# Patient Record
Sex: FEMALE | Race: WHITE | NOT HISPANIC OR LATINO | Employment: OTHER | URBAN - METROPOLITAN AREA
[De-identification: names, ages, dates, MRNs, and addresses within clinical notes are randomized per-mention and may not be internally consistent; named-entity substitution may affect disease eponyms.]

---

## 2022-05-26 ENCOUNTER — APPOINTMENT (EMERGENCY)
Dept: RADIOLOGY | Facility: HOSPITAL | Age: 72
DRG: 871 | End: 2022-05-26
Payer: MEDICARE

## 2022-05-26 ENCOUNTER — OFFICE VISIT (OUTPATIENT)
Dept: URGENT CARE | Facility: MEDICAL CENTER | Age: 72
End: 2022-05-26
Payer: MEDICARE

## 2022-05-26 ENCOUNTER — HOSPITAL ENCOUNTER (INPATIENT)
Facility: HOSPITAL | Age: 72
LOS: 3 days | Discharge: HOME | DRG: 871 | End: 2022-05-29
Attending: EMERGENCY MEDICINE | Admitting: INTERNAL MEDICINE
Payer: MEDICARE

## 2022-05-26 VITALS
HEART RATE: 80 BPM | DIASTOLIC BLOOD PRESSURE: 53 MMHG | SYSTOLIC BLOOD PRESSURE: 112 MMHG | RESPIRATION RATE: 18 BRPM | TEMPERATURE: 98.6 F | OXYGEN SATURATION: 93 %

## 2022-05-26 DIAGNOSIS — R50.9 FEVER, UNSPECIFIED FEVER CAUSE: ICD-10-CM

## 2022-05-26 DIAGNOSIS — J18.9 RLL PNEUMONIA: ICD-10-CM

## 2022-05-26 DIAGNOSIS — R06.02 SOB (SHORTNESS OF BREATH): Primary | ICD-10-CM

## 2022-05-26 DIAGNOSIS — I50.9 CHF (CONGESTIVE HEART FAILURE) (HCC): Primary | ICD-10-CM

## 2022-05-26 DIAGNOSIS — J18.9 PNEUMONIA: ICD-10-CM

## 2022-05-26 LAB
2HR DELTA HS TROPONIN: -6 NG/L
ALBUMIN SERPL BCP-MCNC: 3.6 G/DL (ref 3.5–5)
ALP SERPL-CCNC: 72 U/L (ref 46–116)
ALT SERPL W P-5'-P-CCNC: 28 U/L (ref 12–78)
ANION GAP SERPL CALCULATED.3IONS-SCNC: 8 MMOL/L (ref 4–13)
AST SERPL W P-5'-P-CCNC: 30 U/L (ref 5–45)
BASOPHILS # BLD MANUAL: 0 THOUSAND/UL (ref 0–0.1)
BASOPHILS NFR MAR MANUAL: 0 % (ref 0–1)
BILIRUB SERPL-MCNC: 2.2 MG/DL (ref 0.2–1)
BUN SERPL-MCNC: 18 MG/DL (ref 5–25)
CALCIUM SERPL-MCNC: 9.5 MG/DL (ref 8.3–10.1)
CARDIAC TROPONIN I PNL SERPL HS: 84 NG/L
CARDIAC TROPONIN I PNL SERPL HS: 90 NG/L
CHLORIDE SERPL-SCNC: 100 MMOL/L (ref 100–108)
CO2 SERPL-SCNC: 27 MMOL/L (ref 21–32)
CREAT SERPL-MCNC: 1.2 MG/DL (ref 0.6–1.3)
EOSINOPHIL # BLD MANUAL: 0 THOUSAND/UL (ref 0–0.4)
EOSINOPHIL NFR BLD MANUAL: 0 % (ref 0–6)
ERYTHROCYTE [DISTWIDTH] IN BLOOD BY AUTOMATED COUNT: 13.5 % (ref 11.6–15.1)
FLUAV RNA RESP QL NAA+PROBE: NEGATIVE
FLUBV RNA RESP QL NAA+PROBE: NEGATIVE
GFR SERPL CREATININE-BSD FRML MDRD: 44 ML/MIN/1.73SQ M
GLUCOSE SERPL-MCNC: 95 MG/DL (ref 65–140)
HCT VFR BLD AUTO: 39.8 % (ref 34.8–46.1)
HGB BLD-MCNC: 12.9 G/DL (ref 11.5–15.4)
LYMPHOCYTES # BLD AUTO: 12 % (ref 14–44)
LYMPHOCYTES # BLD AUTO: 2.11 THOUSAND/UL (ref 0.6–4.47)
MCH RBC QN AUTO: 30.9 PG (ref 26.8–34.3)
MCHC RBC AUTO-ENTMCNC: 32.4 G/DL (ref 31.4–37.4)
MCV RBC AUTO: 95 FL (ref 82–98)
MONOCYTES # BLD AUTO: 0.53 THOUSAND/UL (ref 0–1.22)
MONOCYTES NFR BLD: 3 % (ref 4–12)
NEUTROPHILS # BLD MANUAL: 14.96 THOUSAND/UL (ref 1.85–7.62)
NEUTS SEG NFR BLD AUTO: 85 % (ref 43–75)
NT-PROBNP SERPL-MCNC: 3813 PG/ML
PLATELET # BLD AUTO: 103 THOUSANDS/UL (ref 149–390)
PLATELET BLD QL SMEAR: ABNORMAL
PMV BLD AUTO: 11.3 FL (ref 8.9–12.7)
POTASSIUM SERPL-SCNC: 3.9 MMOL/L (ref 3.5–5.3)
PROT SERPL-MCNC: 7.1 G/DL (ref 6.4–8.2)
RBC # BLD AUTO: 4.17 MILLION/UL (ref 3.81–5.12)
RSV RNA RESP QL NAA+PROBE: NEGATIVE
SARS-COV-2 RNA RESP QL NAA+PROBE: NEGATIVE
SODIUM SERPL-SCNC: 135 MMOL/L (ref 136–145)
WBC # BLD AUTO: 17.6 THOUSAND/UL (ref 4.31–10.16)

## 2022-05-26 PROCEDURE — G0463 HOSPITAL OUTPT CLINIC VISIT: HCPCS

## 2022-05-26 PROCEDURE — 87040 BLOOD CULTURE FOR BACTERIA: CPT | Performed by: EMERGENCY MEDICINE

## 2022-05-26 PROCEDURE — 83880 ASSAY OF NATRIURETIC PEPTIDE: CPT | Performed by: EMERGENCY MEDICINE

## 2022-05-26 PROCEDURE — 80053 COMPREHEN METABOLIC PANEL: CPT | Performed by: EMERGENCY MEDICINE

## 2022-05-26 PROCEDURE — 99285 EMERGENCY DEPT VISIT HI MDM: CPT | Performed by: EMERGENCY MEDICINE

## 2022-05-26 PROCEDURE — 99213 OFFICE O/P EST LOW 20 MIN: CPT

## 2022-05-26 PROCEDURE — 96375 TX/PRO/DX INJ NEW DRUG ADDON: CPT

## 2022-05-26 PROCEDURE — 84484 ASSAY OF TROPONIN QUANT: CPT | Performed by: EMERGENCY MEDICINE

## 2022-05-26 PROCEDURE — 93005 ELECTROCARDIOGRAM TRACING: CPT

## 2022-05-26 PROCEDURE — 85007 BL SMEAR W/DIFF WBC COUNT: CPT | Performed by: EMERGENCY MEDICINE

## 2022-05-26 PROCEDURE — 0241U HB NFCT DS VIR RESP RNA 4 TRGT: CPT | Performed by: EMERGENCY MEDICINE

## 2022-05-26 PROCEDURE — 36415 COLL VENOUS BLD VENIPUNCTURE: CPT

## 2022-05-26 PROCEDURE — 1124F ACP DISCUSS-NO DSCNMKR DOCD: CPT | Performed by: EMERGENCY MEDICINE

## 2022-05-26 PROCEDURE — 96365 THER/PROPH/DIAG IV INF INIT: CPT

## 2022-05-26 PROCEDURE — 85027 COMPLETE CBC AUTOMATED: CPT | Performed by: EMERGENCY MEDICINE

## 2022-05-26 PROCEDURE — 71046 X-RAY EXAM CHEST 2 VIEWS: CPT

## 2022-05-26 PROCEDURE — 99284 EMERGENCY DEPT VISIT MOD MDM: CPT

## 2022-05-26 RX ORDER — FUROSEMIDE 10 MG/ML
20 INJECTION INTRAMUSCULAR; INTRAVENOUS ONCE
Status: COMPLETED | OUTPATIENT
Start: 2022-05-26 | End: 2022-05-26

## 2022-05-26 RX ADMIN — AZITHROMYCIN MONOHYDRATE 500 MG: 500 INJECTION, POWDER, LYOPHILIZED, FOR SOLUTION INTRAVENOUS at 23:28

## 2022-05-26 RX ADMIN — FUROSEMIDE 20 MG: 10 INJECTION, SOLUTION INTRAMUSCULAR; INTRAVENOUS at 23:09

## 2022-05-26 RX ADMIN — CEFTRIAXONE SODIUM 1000 MG: 10 INJECTION, POWDER, FOR SOLUTION INTRAVENOUS at 23:09

## 2022-05-26 NOTE — Clinical Note
Case was discussed with ROSIO and the patient's admission status was agreed to be Admission Status: inpatient status to the service of Dr Navin Tolentino

## 2022-05-26 NOTE — PROGRESS NOTES
[address] [facility]        NAME: Humberto Brody is a 66 y o  female  : 1950    MRN: [id number]  DATE: May 26, 2022  TIME: 7:37 PM      Assessment and Plan     SOB (shortness of breath) [R06 02]  1  SOB (shortness of breath)  ECG 12 lead       ekg shows rate 65 bpm with PVCs  Patient agreeable to proceed to the ER for further evaluation- offered EMS declined  Grandson agreeable to drive patient by POV  Patient stable at this time  Patient Instructions     Proceed to the ER for further evaluation  Chief Complaint     Chief Complaint   Patient presents with    Cough     Pt reports cough, sore throat, fever, abd pain, nausea, SOB, and diarrhea           History of Present Illness     Patient is a 75-year-old female who presents with cough, fever, and shortness of breath that started yesterday  States she sees a pulmonologist  States she was last to her pulmonologist on Friday  Reports fever tmax 100 8  states she took Tylenol  Reports intermittent generalized abdominal pain  Reports nausea and loose stools  Denies known sick contacts  Denies CP  States she has been checking her pulse ox at home  States it is normally 80 and it has been 92% at home  Reports leg swelling  Reports hx of afib  Review of Systems     Review of Systems   Constitutional: Positive for fever  Negative for chills  HENT: Positive for sore throat  Negative for congestion and rhinorrhea  Respiratory: Positive for cough and shortness of breath  Cardiovascular: Positive for leg swelling  Negative for chest pain and palpitations  Gastrointestinal: Positive for abdominal pain, diarrhea and nausea  Negative for vomiting  All other systems reviewed and are negative  Current Medications     No current outpatient medications on file      Current Allergies     Allergies as of 2022    (Not on File)              The following portions of the patient's history were reviewed and updated as appropriate: allergies, current medications, past family history, past medical history, past social history, past surgical history and problem list      No past medical history on file  No past surgical history on file  No family history on file  Medications have been verified  Objective     /53   Pulse 80   Temp 98 6 °F (37 °C)   Resp 18   SpO2 93%   No LMP recorded  Physical Exam     Physical Exam  Vitals and nursing note reviewed  Constitutional:       General: She is awake  She is not in acute distress  Appearance: Normal appearance  She is normal weight  She is not ill-appearing, toxic-appearing or diaphoretic  HENT:      Nose: Nose normal       Right Sinus: No maxillary sinus tenderness or frontal sinus tenderness  Left Sinus: No maxillary sinus tenderness or frontal sinus tenderness  Mouth/Throat:      Lips: Pink  Mouth: Mucous membranes are moist       Pharynx: Oropharynx is clear  Uvula midline  No oropharyngeal exudate or posterior oropharyngeal erythema  Cardiovascular:      Rate and Rhythm: Normal rate  Pulses: Normal pulses  Heart sounds: Normal heart sounds, S1 normal and S2 normal  No murmur heard  Pulmonary:      Effort: Pulmonary effort is normal  No tachypnea  Breath sounds: Normal breath sounds and air entry  No stridor, decreased air movement or transmitted upper airway sounds  No decreased breath sounds, wheezing, rhonchi or rales  Abdominal:      General: Abdomen is flat  Bowel sounds are normal  There is no distension  There are no signs of injury  Palpations: Abdomen is soft  Tenderness: There is no abdominal tenderness  There is no guarding or rebound  Musculoskeletal:      Cervical back: Neck supple  Right lower le+ Edema present  Left lower le+ Edema present  Lymphadenopathy:      Cervical: No cervical adenopathy  Skin:     General: Skin is warm        Capillary Refill: Capillary refill takes less than 2 seconds  Neurological:      Mental Status: She is alert  Psychiatric:         Mood and Affect: Mood normal          Behavior: Behavior normal          Thought Content:  Thought content normal          Judgment: Judgment normal

## 2022-05-27 PROBLEM — R79.89 ELEVATED TROPONIN: Status: ACTIVE | Noted: 2022-05-27

## 2022-05-27 PROBLEM — J18.9 RLL PNEUMONIA: Status: ACTIVE | Noted: 2022-05-27

## 2022-05-27 PROBLEM — J44.9 COPD (CHRONIC OBSTRUCTIVE PULMONARY DISEASE) (HCC): Status: ACTIVE | Noted: 2022-05-27

## 2022-05-27 PROBLEM — G47.33 OSA (OBSTRUCTIVE SLEEP APNEA): Status: ACTIVE | Noted: 2022-05-27

## 2022-05-27 PROBLEM — R77.8 ELEVATED TROPONIN: Status: ACTIVE | Noted: 2022-05-27

## 2022-05-27 PROBLEM — A41.9 SEPSIS (HCC): Status: ACTIVE | Noted: 2022-05-27

## 2022-05-27 LAB
4HR DELTA HS TROPONIN: -17 NG/L
ATRIAL RATE: 60 BPM
CARDIAC TROPONIN I PNL SERPL HS: 73 NG/L
PROCALCITONIN SERPL-MCNC: 1.73 NG/ML
PROCALCITONIN SERPL-MCNC: 1.83 NG/ML
QRS AXIS: -69 DEGREES
QRSD INTERVAL: 164 MS
QT INTERVAL: 478 MS
QTC INTERVAL: 497 MS
T WAVE AXIS: 102 DEGREES
VENTRICULAR RATE: 65 BPM

## 2022-05-27 PROCEDURE — 99223 1ST HOSP IP/OBS HIGH 75: CPT | Performed by: INTERNAL MEDICINE

## 2022-05-27 PROCEDURE — 36415 COLL VENOUS BLD VENIPUNCTURE: CPT | Performed by: EMERGENCY MEDICINE

## 2022-05-27 PROCEDURE — 93010 ELECTROCARDIOGRAM REPORT: CPT | Performed by: INTERNAL MEDICINE

## 2022-05-27 PROCEDURE — 84484 ASSAY OF TROPONIN QUANT: CPT | Performed by: EMERGENCY MEDICINE

## 2022-05-27 PROCEDURE — 84145 PROCALCITONIN (PCT): CPT | Performed by: PHYSICIAN ASSISTANT

## 2022-05-27 PROCEDURE — 94760 N-INVAS EAR/PLS OXIMETRY 1: CPT

## 2022-05-27 PROCEDURE — 93005 ELECTROCARDIOGRAM TRACING: CPT

## 2022-05-27 PROCEDURE — 94002 VENT MGMT INPAT INIT DAY: CPT

## 2022-05-27 RX ORDER — ONDANSETRON 2 MG/ML
4 INJECTION INTRAMUSCULAR; INTRAVENOUS EVERY 6 HOURS PRN
Status: DISCONTINUED | OUTPATIENT
Start: 2022-05-27 | End: 2022-05-29 | Stop reason: HOSPADM

## 2022-05-27 RX ORDER — FUROSEMIDE 20 MG/1
20 TABLET ORAL DAILY
COMMUNITY
Start: 2022-05-10

## 2022-05-27 RX ORDER — BUDESONIDE AND FORMOTEROL FUMARATE DIHYDRATE 160; 4.5 UG/1; UG/1
2 AEROSOL RESPIRATORY (INHALATION) 2 TIMES DAILY
Status: DISCONTINUED | OUTPATIENT
Start: 2022-05-27 | End: 2022-05-29 | Stop reason: HOSPADM

## 2022-05-27 RX ORDER — TRIAMCINOLONE ACETONIDE 5 MG/G
1 OINTMENT TOPICAL 2 TIMES DAILY PRN
COMMUNITY
Start: 2022-05-10

## 2022-05-27 RX ORDER — BUDESONIDE, GLYCOPYRROLATE, AND FORMOTEROL FUMARATE 160; 9; 4.8 UG/1; UG/1; UG/1
2 AEROSOL, METERED RESPIRATORY (INHALATION) 2 TIMES DAILY
COMMUNITY
Start: 2022-05-10

## 2022-05-27 RX ORDER — ATORVASTATIN CALCIUM 80 MG/1
80 TABLET, FILM COATED ORAL
COMMUNITY
Start: 2022-05-10

## 2022-05-27 RX ORDER — LANOLIN ALCOHOL/MO/W.PET/CERES
9 CREAM (GRAM) TOPICAL
Status: DISCONTINUED | OUTPATIENT
Start: 2022-05-27 | End: 2022-05-29 | Stop reason: HOSPADM

## 2022-05-27 RX ORDER — FUROSEMIDE 20 MG/1
20 TABLET ORAL DAILY
Status: DISCONTINUED | OUTPATIENT
Start: 2022-05-27 | End: 2022-05-29 | Stop reason: HOSPADM

## 2022-05-27 RX ORDER — ACETAMINOPHEN 325 MG/1
650 TABLET ORAL EVERY 6 HOURS PRN
Status: DISCONTINUED | OUTPATIENT
Start: 2022-05-27 | End: 2022-05-29 | Stop reason: HOSPADM

## 2022-05-27 RX ORDER — ATORVASTATIN CALCIUM 40 MG/1
80 TABLET, FILM COATED ORAL
Status: DISCONTINUED | OUTPATIENT
Start: 2022-05-27 | End: 2022-05-29 | Stop reason: HOSPADM

## 2022-05-27 RX ORDER — GUAIFENESIN 600 MG
600 TABLET, EXTENDED RELEASE 12 HR ORAL 2 TIMES DAILY
Status: DISCONTINUED | OUTPATIENT
Start: 2022-05-27 | End: 2022-05-29 | Stop reason: HOSPADM

## 2022-05-27 RX ORDER — ENOXAPARIN SODIUM 100 MG/ML
40 INJECTION SUBCUTANEOUS DAILY
Status: DISCONTINUED | OUTPATIENT
Start: 2022-05-27 | End: 2022-05-27

## 2022-05-27 RX ORDER — METOPROLOL SUCCINATE 100 MG/1
100 TABLET, EXTENDED RELEASE ORAL
COMMUNITY
Start: 2022-05-10

## 2022-05-27 RX ORDER — LEVOTHYROXINE SODIUM 137 UG/1
137 TABLET ORAL DAILY
COMMUNITY
Start: 2022-02-27

## 2022-05-27 RX ORDER — METOPROLOL SUCCINATE 100 MG/1
100 TABLET, EXTENDED RELEASE ORAL
Status: DISCONTINUED | OUTPATIENT
Start: 2022-05-27 | End: 2022-05-29 | Stop reason: HOSPADM

## 2022-05-27 RX ORDER — APIXABAN 5 MG/1
5 TABLET, FILM COATED ORAL 2 TIMES DAILY
COMMUNITY
Start: 2022-03-28

## 2022-05-27 RX ADMIN — LEVOTHYROXINE SODIUM 137 MCG: 25 TABLET ORAL at 09:32

## 2022-05-27 RX ADMIN — BUDESONIDE AND FORMOTEROL FUMARATE DIHYDRATE 2 PUFF: 160; 4.5 AEROSOL RESPIRATORY (INHALATION) at 09:27

## 2022-05-27 RX ADMIN — Medication 9 MG: at 22:49

## 2022-05-27 RX ADMIN — ATORVASTATIN CALCIUM 80 MG: 40 TABLET, FILM COATED ORAL at 21:17

## 2022-05-27 RX ADMIN — GUAIFENESIN 600 MG: 600 TABLET ORAL at 09:21

## 2022-05-27 RX ADMIN — APIXABAN 5 MG: 5 TABLET, FILM COATED ORAL at 21:17

## 2022-05-27 RX ADMIN — APIXABAN 5 MG: 5 TABLET, FILM COATED ORAL at 09:21

## 2022-05-27 RX ADMIN — BUDESONIDE AND FORMOTEROL FUMARATE DIHYDRATE 2 PUFF: 160; 4.5 AEROSOL RESPIRATORY (INHALATION) at 17:17

## 2022-05-27 RX ADMIN — GUAIFENESIN 600 MG: 600 TABLET ORAL at 17:16

## 2022-05-27 RX ADMIN — CEFTRIAXONE SODIUM 1000 MG: 10 INJECTION, POWDER, FOR SOLUTION INTRAVENOUS at 22:53

## 2022-05-27 RX ADMIN — APIXABAN 5 MG: 5 TABLET, FILM COATED ORAL at 02:50

## 2022-05-27 NOTE — H&P
[address] [facility]  H&P- Ladan Jiménez 1950, 66 y o  female MRN: [id number]  Unit/Bed#: FT 05 Encounter: [id number]  Primary Care Provider: No primary care provider on file  Date and time admitted to hospital: 5/26/2022 10:02 PM    * Sepsis [facility])  Assessment & Plan  Meeting sepsis criteria for fever, leukocytosis in setting of pneumonia   · Check lactic acid, procal   · Blood cultures obtained and pending     WATSON (obstructive sleep apnea)  Assessment & Plan  · Continue cpap    COPD (chronic obstructive pulmonary disease) ([facility])  Assessment & Plan  · Not in acute exacerbation   · Continue home inhalers     Elevated troponin  Assessment & Plan  · EKG non-ischemic  · Initial troponin 90, negative delta   · Pt denies chest pain  Likely non-MI elevation  · BNP 3,813        RLL pneumonia  Assessment & Plan  Patient presents with cough, fever, and shortness of breath x 1d  · CXR confirm RLL PNA   · COVID/flu/rsv negative   · Empirically started on rocephin, azithromycin   · Check procal   · IS, supportive care, tylenol prn fevers     VTE Pharmacologic Prophylaxis: VTE Score: 5 High Risk (Score >/= 5) - Pharmacological DVT Prophylaxis Ordered: apixaban (Eliquis)  Sequential Compression Devices Ordered  Code Status: Level 1 - Full Code   Discussion with family: Patient declined call to   Anticipated Length of Stay: Patient will be admitted on an inpatient basis with an anticipated length of stay of greater than 2 midnights secondary to pneumonia  Total Time for Visit, including Counseling / Coordination of Care: 60 minutes Greater than 50% of this total time spent on direct patient counseling and coordination of care      Chief Complaint:   Chief Complaint   Patient presents with    Cough     Pt reports increased coughing, abdominal pain, chills, generalized body weakness, sent by urgent care for evaluation         History of Present Illness:  Ladan Jiménez is a 66 y o  female with a PMH of COPD, WATSON, afib, hx of complete heart block s/p pacemaker, tricuspid valve replacement, who presents with non-productive cough, fever tmax 100 8F, and chills x2 days  Had associated n/v/d, headache, lethargy, and swollen lymph nodes in neck  Denies exposure to sick contacts  No chest pain  Has chronic shortness of breath with exertion x several months  Reports breathing is stable without recent change  She has chronic swelling in her LE, R>L  She has had surgery in veins in past  Quit smoking >30 years ago  Follows with cardiology and pulmonology in [facility]  Recently moved to this area  Review of Systems:  A 10-point review of systems was obtained  Pertinent positives and negatives are outlined in the HPI above  Remainder of review of systems are otherwise negative  Past Medical and Surgical History:   Past Medical History:   Diagnosis Date    Abnormal cardiac valve     Pacemaker        History reviewed  No pertinent surgical history  Meds/Allergies:  Prior to Admission medications    Not on File     I have reviewed home medications with patient personally  Allergies: Allergies   Allergen Reactions    Tramadol Dizziness       Social History:  Marital Status:     Occupation:   Patient Pre-hospital Living Situation: Home  Patient Pre-hospital Level of Mobility: walks  Patient Pre-hospital Diet Restrictions:   Substance Use History:   Social History     Substance and Sexual Activity   Alcohol Use None     Social History     Tobacco Use   Smoking Status Never Smoker   Smokeless Tobacco Never Used     Social History     Substance and Sexual Activity   Drug Use Not on file       Family History:  Noncontributory     Physical Exam:     Vitals:   Blood Pressure: 106/53 (05/27/22 0600)  Pulse: 60 (05/27/22 0600)  Temperature: 98 °F (36 7 °C) (05/27/22 0034)  Temp Source: Tympanic (05/27/22 0034)  Respirations: 20 (05/27/22 0600)  Height: 5' 3" (160 cm) (05/27/22 0034)  Weight - Scale: 73 3 kg (161 lb 9 6 oz) (05/27/22 0034)  SpO2: 96 % (05/27/22 0600)    Physical Exam  Vitals and nursing note reviewed  Constitutional:       General: She is not in acute distress  Appearance: She is well-developed  HENT:      Head: Normocephalic and atraumatic  Eyes:      General: No scleral icterus  Extraocular Movements: Extraocular movements intact  Conjunctiva/sclera: Conjunctivae normal       Pupils: Pupils are equal, round, and reactive to light  Cardiovascular:      Rate and Rhythm: Normal rate and regular rhythm  Heart sounds: Murmur heard  No friction rub  Pulmonary:      Effort: Pulmonary effort is normal  No respiratory distress  Breath sounds: Normal breath sounds  No wheezing or rales  Abdominal:      General: Abdomen is flat  Bowel sounds are normal       Palpations: Abdomen is soft  Tenderness: There is no abdominal tenderness  Musculoskeletal:         General: Normal range of motion  Cervical back: Neck supple  Right lower leg: Edema (1+, nonpitting) present  Left lower leg: Edema (1+, nonpitting) present  Skin:     General: Skin is warm and dry  Neurological:      General: No focal deficit present  Mental Status: She is alert     Psychiatric:         Mood and Affect: Mood normal           Additional Data:     Lab Results:  Results from last 7 days   Lab Units 05/26/22  2041   WBC Thousand/uL 17 60*   HEMOGLOBIN g/dL 12 9   HEMATOCRIT % 39 8   PLATELETS Thousands/uL 103*   LYMPHO PCT % 12*   MONO PCT % 3*   EOS PCT % 0     Results from last 7 days   Lab Units 05/26/22 2041   SODIUM mmol/L 135*   POTASSIUM mmol/L 3 9   CHLORIDE mmol/L 100   CO2 mmol/L 27   BUN mg/dL 18   CREATININE mg/dL 1 20   ANION GAP mmol/L 8   CALCIUM mg/dL 9 5   ALBUMIN g/dL 3 6   TOTAL BILIRUBIN mg/dL 2 20*   ALK PHOS U/L 72   ALT U/L 28   AST U/L 30   GLUCOSE RANDOM mg/dL 95                 Results from last 7 days   Lab Units 05/27/22  0503 05/27/22  0205   PROCALCITONIN ng/ml 1 73* 1 83*       Imaging: Reviewed radiology reports from this admission including: chest xray  XR chest 2 views   Final Result by Gina Stafford MD (05/26 2309)      Right lower lobe infiltrate and/or atelectasis noted  Workstation performed: [id number]             EKG and Other Studies Reviewed on Admission:   · EKG: NSR  HR 63  av-paced rhythym   ** Please Note: This note has been constructed using a voice recognition system   **

## 2022-05-27 NOTE — CASE MANAGEMENT
Case Management Assessment & Discharge Planning Note    Patient name Bertha Vann  Location FT  MRN [id number]  : 1950 Date 2022       Current Admission Date: 2022  Current Admission Diagnosis:Sepsis [facility])   Patient Active Problem List    Diagnosis Date Noted    RLL pneumonia 2022    Sepsis [facility] [address] ) 2022    Elevated troponin 2022    [facility]    WATSON (obstructive sleep apnea) 2022      LOS (days): 1  Geometric Mean LOS (GMLOS) (days): 4 80  Days to GMLOS:4 2     OBJECTIVE:    Risk of Unplanned Readmission Score: 9 53         Current admission status: Inpatient       Preferred Pharmacy: No Pharmacies Listed  Primary Care Provider: No primary care provider on file  Primary Insurance: MEDICARE  Secondary Insurance:     ASSESSMENT:  [phone] [facility],[address] Representative - Daughter   Primary Phone: [phone] (Mobile)               Advance Directives  Does patient have a [address] [facility] [address] [facility] [address]?: No  Was patient offered paperwork?: Yes (Patient declined paperwork at this time )  Does patient currently have a Health Care decision maker?: Yes, please see Health Care Proxy section (Patient identified her daughter as her health care representative )  Does patient have Advance Directives?: No  Was patient offered paperwork?: Yes (Patient declined paperwork at this time )  Primary Contact: Patient's Daughter    Readmission Root Cause  30 Day Readmission: No    Patient Information  Admitted from[de-identified] Home  Mental Status: Alert  During Assessment patient was accompanied by: Not accompanied during assessment  Assessment information provided by[de-identified] Patient  Primary Caregiver: Self  Support Systems: Family members, [address] Dr of Residence: [address] [place] [address] do you live in?: [address] [place] [address] Rd entry access options   Select all that apply : Stairs  Number of steps to enter home : 4  Do the steps have railings?: Yes  Type of Current Residence: [facility]  In the last 12 months, was there a time when you were not able to pay the mortgage or rent on time?: No  In the last 12 months, how many places have you lived?: 2  In the last 12 months, was there a time when you did not have a steady place to sleep or slept in a shelter (including now)?: No  Homeless/housing insecurity resource given?: N/A  Living Arrangements: Other (Comment) (lives with grandson)  Is patient a ?: No    Activities of Daily Living Prior to Admission  Functional Status: Independent  Completes ADLs independently?: Yes  Ambulates independently?: Yes  Does patient use assisted devices?: No  Does patient currently own DME?: No  Does patient have a history of Outpatient Therapy (PT/OT)?: No  Does the patient have a history of Short-Term Rehab?: No  Does patient have a history of HHC?: No  Does patient currently have [employer]?: No    Patient Information Continued  Income Source: Pension/USP  Does patient have prescription coverage?: Yes (Patient reported that she prefers to use [employer], and she denied any barriers to obtaining or affording prescriptions )  Within the past 12 months, you worried that your food would run out before you got the money to buy more : Never true  Within the past 12 months, the food you bought just didn't last and you didn't have money to get more : Never true  Food insecurity resource given?: N/A  Does patient receive dialysis treatments?: No  Does patient have a history of substance abuse?: No  Does patient have a history of Mental Health Diagnosis?: Yes (depression)  Is patient receiving treatment for mental health?: Yes (Patient reported that she sees a psychiatrist and takes her medications as directed )  Has patient received inpatient treatment related to mental health in the last 2 years?: No    Means of Transportation  Means of Transport to Appts[de-identified] Drives Self  In the past 12 months, has lack of transportation kept you from medical appointments or from getting medications?: No  In the past 12 months, has lack of transportation kept you from meetings, work, or from getting things needed for daily living?: No  Was application for public transport provided?: N/A    DISCHARGE DETAILS:    Discharge planning discussed with[de-identified] Patient  Freedom of Choice: Yes  Comments - Freedom of Choice: CM discussed freedom of choice as it pertains to discharge planning  Patient denied any needs at this time and she is not interested in [facility]    CM contacted family/caregiver?: No- see comments (Patient declined phone call at this time )  Were Treatment Team discharge recommendations reviewed with patient/caregiver?: Yes  Did patient/caregiver verbalize understanding of patient care needs?: Yes  Were patient/caregiver advised of the risks associated with not following Treatment Team discharge recommendations?: Yes    [address]         Is the patient interested in [facility] at discharge?: No    DME Referral Provided  Referral made for DME?: No    Would you like to participate in our [address] service program?  : No - Declined    Treatment Team Recommendation: Home  Discharge Destination Plan[de-identified] Home  Transport at Discharge : Family

## 2022-05-27 NOTE — ASSESSMENT & PLAN NOTE
Meeting sepsis criteria for fever, leukocytosis in setting of pneumonia   · Check lactic acid, procal   · Blood cultures obtained and pending

## 2022-05-27 NOTE — NURSING NOTE
Education provided regarding use of incentive spirometer  Purpose such as lung expansion and airway clearance discussed  Correct way to use IS explained  Per patient, she has used IS in past  Patient demonstrates understanding, teach back method utilized

## 2022-05-27 NOTE — ASSESSMENT & PLAN NOTE
· EKG non-ischemic  · Initial troponin 90, negative delta   · Pt denies chest pain   Likely non-MI elevation  · BNP 3,813

## 2022-05-27 NOTE — ASSESSMENT & PLAN NOTE
Patient presents with cough, fever, and shortness of breath x 1d  · CXR confirm RLL PNA   · COVID/flu/rsv negative   · Empirically started on rocephin, azithromycin   · Check procal   · IS, supportive care, tylenol prn fevers

## 2022-05-28 LAB
ALBUMIN SERPL BCP-MCNC: 3.1 G/DL (ref 3.5–5)
ALP SERPL-CCNC: 74 U/L (ref 46–116)
ALT SERPL W P-5'-P-CCNC: 28 U/L (ref 12–78)
ANION GAP SERPL CALCULATED.3IONS-SCNC: 8 MMOL/L (ref 4–13)
AST SERPL W P-5'-P-CCNC: 23 U/L (ref 5–45)
BILIRUB SERPL-MCNC: 1.37 MG/DL (ref 0.2–1)
BUN SERPL-MCNC: 11 MG/DL (ref 5–25)
CALCIUM ALBUM COR SERPL-MCNC: 10.1 MG/DL (ref 8.3–10.1)
CALCIUM SERPL-MCNC: 9.4 MG/DL (ref 8.3–10.1)
CHLORIDE SERPL-SCNC: 103 MMOL/L (ref 100–108)
CO2 SERPL-SCNC: 27 MMOL/L (ref 21–32)
CREAT SERPL-MCNC: 0.82 MG/DL (ref 0.6–1.3)
ERYTHROCYTE [DISTWIDTH] IN BLOOD BY AUTOMATED COUNT: 13.2 % (ref 11.6–15.1)
GFR SERPL CREATININE-BSD FRML MDRD: 71 ML/MIN/1.73SQ M
GLUCOSE SERPL-MCNC: 112 MG/DL (ref 65–140)
HCT VFR BLD AUTO: 36.1 % (ref 34.8–46.1)
HGB BLD-MCNC: 11.5 G/DL (ref 11.5–15.4)
MCH RBC QN AUTO: 30.7 PG (ref 26.8–34.3)
MCHC RBC AUTO-ENTMCNC: 31.9 G/DL (ref 31.4–37.4)
MCV RBC AUTO: 96 FL (ref 82–98)
PLATELET # BLD AUTO: 84 THOUSANDS/UL (ref 149–390)
PMV BLD AUTO: 12.1 FL (ref 8.9–12.7)
POTASSIUM SERPL-SCNC: 3.8 MMOL/L (ref 3.5–5.3)
PROT SERPL-MCNC: 6.7 G/DL (ref 6.4–8.2)
RBC # BLD AUTO: 3.75 MILLION/UL (ref 3.81–5.12)
SODIUM SERPL-SCNC: 138 MMOL/L (ref 136–145)
WBC # BLD AUTO: 9.55 THOUSAND/UL (ref 4.31–10.16)

## 2022-05-28 PROCEDURE — 94660 CPAP INITIATION&MGMT: CPT

## 2022-05-28 PROCEDURE — 85027 COMPLETE CBC AUTOMATED: CPT | Performed by: PHYSICIAN ASSISTANT

## 2022-05-28 PROCEDURE — 80053 COMPREHEN METABOLIC PANEL: CPT | Performed by: PHYSICIAN ASSISTANT

## 2022-05-28 PROCEDURE — 94760 N-INVAS EAR/PLS OXIMETRY 1: CPT

## 2022-05-28 PROCEDURE — 99232 SBSQ HOSP IP/OBS MODERATE 35: CPT | Performed by: INTERNAL MEDICINE

## 2022-05-28 RX ORDER — DOCOSANOL 100 MG/G
CREAM TOPICAL
Status: DISCONTINUED | OUTPATIENT
Start: 2022-05-28 | End: 2022-05-29 | Stop reason: HOSPADM

## 2022-05-28 RX ADMIN — GUAIFENESIN 600 MG: 600 TABLET ORAL at 17:08

## 2022-05-28 RX ADMIN — DOCOSANOL 10 % TOPICAL CREAM: at 22:16

## 2022-05-28 RX ADMIN — APIXABAN 5 MG: 5 TABLET, FILM COATED ORAL at 10:00

## 2022-05-28 RX ADMIN — DOCOSANOL 10 % TOPICAL CREAM: at 17:49

## 2022-05-28 RX ADMIN — BUDESONIDE AND FORMOTEROL FUMARATE DIHYDRATE 2 PUFF: 160; 4.5 AEROSOL RESPIRATORY (INHALATION) at 17:08

## 2022-05-28 RX ADMIN — Medication 9 MG: at 22:13

## 2022-05-28 RX ADMIN — GUAIFENESIN 600 MG: 600 TABLET ORAL at 09:45

## 2022-05-28 RX ADMIN — LEVOTHYROXINE SODIUM 137 MCG: 25 TABLET ORAL at 09:45

## 2022-05-28 RX ADMIN — CEFTRIAXONE SODIUM 1000 MG: 10 INJECTION, POWDER, FOR SOLUTION INTRAVENOUS at 22:13

## 2022-05-28 RX ADMIN — BUDESONIDE AND FORMOTEROL FUMARATE DIHYDRATE 2 PUFF: 160; 4.5 AEROSOL RESPIRATORY (INHALATION) at 09:45

## 2022-05-28 RX ADMIN — FUROSEMIDE 20 MG: 20 TABLET ORAL at 09:45

## 2022-05-28 RX ADMIN — APIXABAN 5 MG: 5 TABLET, FILM COATED ORAL at 22:13

## 2022-05-28 RX ADMIN — ATORVASTATIN CALCIUM 80 MG: 40 TABLET, FILM COATED ORAL at 22:13

## 2022-05-28 NOTE — PROGRESS NOTES
[address] [facility]  Progress Note - Edythe Range 1950, 66 y o  female MRN: [id number]  Unit/Bed#: - Encounter: [id number]  Primary Care Provider: No primary care provider on file  Date and time admitted to hospital: 2022 10:02 PM    WATSON (obstructive sleep apnea)  Assessment & Plan  · Continue cpap    COPD (chronic obstructive pulmonary disease) ([facility] [address] )  Assessment & Plan  · Not in acute exacerbation  · C/w home inhalers      Elevated troponin  Assessment & Plan  Non mi elevation troponin  No current chest pain  No further eval necessary      RLL pneumonia  Assessment & Plan  · Presented initally with fever, cough, and SOB  · Found to have RLL pneumonia on cxr  · C/w iv abx     * [facility]  Assessment & Plan  Presented with leukocytosis and fever  2/2 to RLL pneumonia  Has since improved        VTE Pharmacologic Prophylaxis:   Pharmacologic: Apixaban (Eliquis)  Mechanical VTE Prophylaxis in Place: Yes    Patient Centered Rounds: I have performed bedside rounds with nursing staff today  Discussions with Specialists or Other Care Team Provider: cm, nursing    Education and Discussions with Family / Patient: pt    Time Spent for Care: 30 minutes  More than 50% of total time spent on counseling and coordination of care as described above  Current Length of Stay: 2 day(s)    Current Patient Status: Inpatient   Certification Statement: The patient will continue to require additional inpatient hospital stay due to see above    Discharge Plan: see plan above    Code Status: Level 1 - Full Code      Subjective:   Denies chest pain, cough, fevers, chills    Objective:     Vitals:   Temp (24hrs), Av 1 °F (37 3 °C), Min:98 °F (36 7 °C), Max:100 1 °F (37 8 °C)    Temp:  [98 °F (36 7 °C)-100 1 °F (37 8 °C)] 98 °F (36 7 °C)  HR:  [60-69] 60  Resp:  [16-20] 18  BP: (102-110)/(57-77) 110/77  SpO2:  [93 %-97 %] 96 %  Body mass index is 28 63 kg/m²       Input and Output Summary (last 24 hours): Intake/Output Summary (Last 24 hours) at 5/28/2022 0958  Last data filed at 5/28/2022 0601  Gross per 24 hour   Intake 240 ml   Output 800 ml   Net -560 ml       Physical Exam:     Physical Exam  Constitutional:       General: She is not in acute distress  Appearance: She is well-developed  She is not diaphoretic  HENT:      Head: Normocephalic and atraumatic  Nose: Nose normal       Mouth/Throat:      Pharynx: No oropharyngeal exudate  Eyes:      General: No scleral icterus  Right eye: No discharge  Left eye: No discharge  Conjunctiva/sclera: Conjunctivae normal    Neck:      Thyroid: No thyromegaly  Vascular: No JVD  Cardiovascular:      Rate and Rhythm: Normal rate and regular rhythm  Heart sounds: Normal heart sounds  No murmur heard  No friction rub  No gallop  Pulmonary:      Effort: Pulmonary effort is normal  No respiratory distress  Breath sounds: Normal breath sounds  No wheezing or rales  Chest:      Chest wall: No tenderness  Abdominal:      General: Bowel sounds are normal  There is no distension  Palpations: Abdomen is soft  Tenderness: There is no abdominal tenderness  There is no guarding or rebound  Musculoskeletal:         General: No tenderness or deformity  Normal range of motion  Cervical back: Normal range of motion and neck supple  Skin:     General: Skin is warm and dry  Findings: No erythema or rash  Neurological:      Mental Status: She is alert  Mental status is at baseline  Cranial Nerves: No cranial nerve deficit  Sensory: No sensory deficit  Motor: No abnormal muscle tone        Coordination: Coordination normal            Additional Data:     Labs:    Results from last 7 days   Lab Units 05/28/22  0447 05/26/22 2041   WBC Thousand/uL 9 55 17 60*   HEMOGLOBIN g/dL 11 5 12 9   HEMATOCRIT % 36 1 39 8   PLATELETS Thousands/uL 84* 103*   LYMPHO PCT %  --  12*   MONO PCT %  -- 3*   EOS PCT %  --  0     Results from last 7 days   Lab Units 05/28/22  0447   SODIUM mmol/L 138   POTASSIUM mmol/L 3 8   CHLORIDE mmol/L 103   CO2 mmol/L 27   BUN mg/dL 11   CREATININE mg/dL 0 82   ANION GAP mmol/L 8   CALCIUM mg/dL 9 4   ALBUMIN g/dL 3 1*   TOTAL BILIRUBIN mg/dL 1 37*   ALK PHOS U/L 74   ALT U/L 28   AST U/L 23   GLUCOSE RANDOM mg/dL 112                 Results from last 7 days   Lab Units 05/27/22  0503 05/27/22  0205   PROCALCITONIN ng/ml 1 73* 1 83*           * I Have Reviewed All Lab Data Listed Above  * Additional Pertinent Lab Tests Reviewed: All Labs Within Last 24 Hours Reviewed    Imaging:    Imaging Reports Reviewed Today Include: na  Imaging Personally Reviewed by Myself Includes:  na    Recent Cultures (last 7 days):     Results from last 7 days   Lab Units 05/26/22  2226   BLOOD CULTURE  No Growth at 24 hrs  No Growth at 24 hrs  Last 24 Hours Medication List:   Current Facility-Administered Medications   Medication Dose Route Frequency Provider Last Rate    acetaminophen  650 mg Oral Q6H PRN Katia Awan PA-C      apixaban  5 mg Oral Q12H [address] [place], [place]      atorvastatin  80 mg Oral HS Katia Awan PA-C      budesonide-formoterol  2 puff Inhalation BID Katia Awan PA-C      cefTRIAXone  1,000 mg Intravenous Q24H Katia Awan PA-C 1,000 mg (05/27/22 2253)    furosemide  20 mg Oral Daily Katia Awan PA-C      guaiFENesin  600 mg Oral BID Katia Awan PA-C      levothyroxine  137 mcg Oral Daily Danny Reaves      melatonin  9 mg Oral HS PRN [facility] Hospital[facility] MAR[facility]      metoprolol succinate  100 mg Oral HS Katia Awan PA-C      ondansetron  4 mg Intravenous Q6H PRN Katia Awan PA-C          Today, Patient Was Seen By: Pierre Hill MD    ** Please Note: Dictation voice to text software may have been used in the creation of this document   **

## 2022-05-28 NOTE — PLAN OF CARE
Problem: PAIN - ADULT  Goal: Verbalizes/displays adequate comfort level or baseline comfort level  Description: Interventions:  - Encourage patient to monitor pain and request assistance  - Assess pain using appropriate pain scale  - Administer analgesics based on type and severity of pain and evaluate response  - Implement non-pharmacological measures as appropriate and evaluate response  - Consider cultural and social influences on pain and pain management  - Notify physician/advanced practitioner if interventions unsuccessful or patient reports new pain  Outcome: Progressing     Problem: DISCHARGE PLANNING  Goal: Discharge to home or other facility with appropriate resources  Description: INTERVENTIONS:  - Identify barriers to discharge w/patient and caregiver  - Arrange for needed discharge resources and transportation as appropriate  - Identify discharge learning needs (meds, wound care, etc )  - Arrange for interpretive services to assist at discharge as needed  - Refer to Case Management Department for coordinating discharge planning if the patient needs post-hospital services based on physician/advanced practitioner order or complex needs related to functional status, cognitive ability, or social support system  Outcome: Progressing     Problem: Knowledge Deficit  Goal: Patient/family/caregiver demonstrates understanding of disease process, treatment plan, medications, and discharge instructions  Description: Complete learning assessment and assess knowledge base    Interventions:  - Provide teaching at level of understanding  - Provide teaching via preferred learning methods  Outcome: Progressing     Problem: RESPIRATORY - ADULT  Goal: Achieves optimal ventilation and oxygenation  Description: INTERVENTIONS:  - Assess for changes in respiratory status  - Assess for changes in mentation and behavior  - Position to facilitate oxygenation and minimize respiratory effort  - Oxygen administered by appropriate delivery if ordered  - Initiate smoking cessation education as indicated  - Encourage broncho-pulmonary hygiene including cough, deep breathe, Incentive Spirometry  - Assess the need for suctioning and aspirate as needed  - Assess and instruct to report SOB or any respiratory difficulty  - Respiratory Therapy support as indicated  Outcome: Progressing

## 2022-05-28 NOTE — RESPIRATORY THERAPY NOTE
05/27/22 0408   Respiratory Assessment   Resp Comments placed pt on cpap for HS use, pt states home setting to be 10   Non-Invasive Information   O2 Interface Device Face mask  (small)   Non-Invasive Ventilation Mode CPAP   $ Continous NIV Initial   SpO2 96 %   $ Pulse Oximetry Spot Check Charge Completed   Non-Invasive Settings   FiO2 (%) 21   PEEP/CPAP (cm H2O) 10   Rise Time 2  (c flex)   Humidification   (n/a)   Temperature (Set)   (n/a)   Non-Invasive Readings   Skin Intervention Skin intact   Total Rate 25   Spontaneous Vt (mL) 466   Spontaneous MV (mL) 14 5   I/E Ratio (Obs) 1:2 2   Heater Temperature (Obs)   (n/a)   Leak (lpm) 35   Non-Invasive Alarms   MV Low (L/min) 2   High Resp Rate (BPM) 40 BPM   Apnea Interval (sec) 30

## 2022-05-28 NOTE — PLAN OF CARE
Problem: PAIN - ADULT  Goal: Verbalizes/displays adequate comfort level or baseline comfort level  Description: Interventions:  - Encourage patient to monitor pain and request assistance  - Assess pain using appropriate pain scale  - Administer analgesics based on type and severity of pain and evaluate response  - Implement non-pharmacological measures as appropriate and evaluate response  - Consider cultural and social influences on pain and pain management  - Notify physician/advanced practitioner if interventions unsuccessful or patient reports new pain  Outcome: Progressing     Problem: SAFETY ADULT  Goal: Patient will remain free of falls  Description: INTERVENTIONS:  - Educate patient/family on patient safety including physical limitations  - Instruct patient to call for assistance with activity   - Consult OT/PT to assist with strengthening/mobility   - Keep Call bell within reach  - Keep bed low and locked with side rails adjusted as appropriate  - Keep care items and personal belongings within reach  - Initiate and maintain comfort rounds  - Make Fall Risk Sign visible to staff  - Offer Toileting every  Hours, in advance of need  - Initiate/Maintain alarm  - Obtain necessary fall risk management equipment: Apply yellow socks and bracelet for high fall risk patients  - Consider moving patient to room near nurses station  Outcome: Progressing     Problem: DISCHARGE PLANNING  Goal: Discharge to home or other facility with appropriate resources  Description: INTERVENTIONS:  - Identify barriers to discharge w/patient and caregiver  - Arrange for needed discharge resources and transportation as appropriate  - Identify discharge learning needs (meds, wound care, etc )  - Arrange for interpretive services to assist at discharge as needed  - Refer to Case Management Department for coordinating discharge planning if the patient needs post-hospital services based on physician/advanced practitioner order or complex needs related to functional status, cognitive ability, or social support system  Outcome: Progressing     Problem: Knowledge Deficit  Goal: Patient/family/caregiver demonstrates understanding of disease process, treatment plan, medications, and discharge instructions  Description: Complete learning assessment and assess knowledge base    Interventions:  - Provide teaching at level of understanding  - Provide teaching via preferred learning methods  Outcome: Progressing     Problem: RESPIRATORY - ADULT  Goal: Achieves optimal ventilation and oxygenation  Description: INTERVENTIONS:  - Assess for changes in respiratory status  - Assess for changes in mentation and behavior  - Position to facilitate oxygenation and minimize respiratory effort  - Oxygen administered by appropriate delivery if ordered  - Initiate smoking cessation education as indicated  - Encourage broncho-pulmonary hygiene including cough, deep breathe, Incentive Spirometry  - Assess the need for suctioning and aspirate as needed  - Assess and instruct to report SOB or any respiratory difficulty  - Respiratory Therapy support as indicated  Outcome: Progressing

## 2022-05-28 NOTE — RESPIRATORY THERAPY NOTE
05/27/22 7605   Respiratory Assessment   Resp Comments placed pt on cpap for HS use, pt states home setting to be 10   O2 Device v30 Eeyore   Non-Invasive Information   O2 Interface Device Face mask  (small)   Non-Invasive Ventilation Mode CPAP   $ Continous NIV Initial   SpO2 96 %   $ Pulse Oximetry Spot Check Charge Completed   Non-Invasive Settings   FiO2 (%) 21   PEEP/CPAP (cm H2O) 10   Rise Time 2  (c flex)   Humidification   (n/a)   Temperature (Set)   (n/a)   Non-Invasive Readings   Skin Intervention Skin intact   Total Rate 25   Spontaneous Vt (mL) 466   Spontaneous MV (mL) 14 5   I/E Ratio (Obs) 1:2 2   Heater Temperature (Obs)   (n/a)   Leak (lpm) 35   Non-Invasive Alarms   MV Low (L/min) 2   High Resp Rate (BPM) 40 BPM   Apnea Interval (sec) 30

## 2022-05-29 VITALS
BODY MASS INDEX: 28.63 KG/M2 | WEIGHT: 161.6 LBS | RESPIRATION RATE: 16 BRPM | SYSTOLIC BLOOD PRESSURE: 109 MMHG | HEIGHT: 63 IN | OXYGEN SATURATION: 96 % | DIASTOLIC BLOOD PRESSURE: 55 MMHG | HEART RATE: 60 BPM | TEMPERATURE: 97.8 F

## 2022-05-29 PROCEDURE — 94760 N-INVAS EAR/PLS OXIMETRY 1: CPT

## 2022-05-29 PROCEDURE — 99239 HOSP IP/OBS DSCHRG MGMT >30: CPT | Performed by: INTERNAL MEDICINE

## 2022-05-29 RX ORDER — AMOXICILLIN AND CLAVULANATE POTASSIUM 875; 125 MG/1; MG/1
1 TABLET, FILM COATED ORAL EVERY 12 HOURS SCHEDULED
Qty: 10 TABLET | Refills: 0 | Status: SHIPPED | OUTPATIENT
Start: 2022-05-29 | End: 2022-06-03

## 2022-05-29 RX ADMIN — LEVOTHYROXINE SODIUM 137 MCG: 25 TABLET ORAL at 08:24

## 2022-05-29 RX ADMIN — DOCOSANOL 10 % TOPICAL CREAM: at 11:11

## 2022-05-29 RX ADMIN — APIXABAN 5 MG: 5 TABLET, FILM COATED ORAL at 08:24

## 2022-05-29 RX ADMIN — DOCOSANOL 10 % TOPICAL CREAM: at 06:42

## 2022-05-29 RX ADMIN — BUDESONIDE AND FORMOTEROL FUMARATE DIHYDRATE 2 PUFF: 160; 4.5 AEROSOL RESPIRATORY (INHALATION) at 08:27

## 2022-05-29 RX ADMIN — GUAIFENESIN 600 MG: 600 TABLET ORAL at 08:24

## 2022-05-29 RX ADMIN — FUROSEMIDE 20 MG: 20 TABLET ORAL at 08:24

## 2022-05-29 NOTE — ASSESSMENT & PLAN NOTE
· Presented initally with fever, cough, and SOB  · Found to have RLL pneumonia on cxr  · Clinically improved  · Will transition oral antibiotics

## 2022-05-29 NOTE — PLAN OF CARE
Problem: Knowledge Deficit  Goal: Patient/family/caregiver demonstrates understanding of disease process, treatment plan, medications, and discharge instructions  Description: Complete learning assessment and assess knowledge base    Interventions:  - Provide teaching at level of understanding  - Provide teaching via preferred learning methods  Outcome: Progressing     Problem: PAIN - ADULT  Goal: Verbalizes/displays adequate comfort level or baseline comfort level  Description: Interventions:  - Encourage patient to monitor pain and request assistance  - Assess pain using appropriate pain scale  - Administer analgesics based on type and severity of pain and evaluate response  - Implement non-pharmacological measures as appropriate and evaluate response  - Consider cultural and social influences on pain and pain management  - Notify physician/advanced practitioner if interventions unsuccessful or patient reports new pain  Outcome: Progressing

## 2022-05-29 NOTE — DISCHARGE SUMMARY
[facility]  Discharge- Marie Strauss 1950, 66 y o  female MRN: [id number]  Unit/Bed#: -01 Encounter: [id number]  Primary Care Provider: No primary care provider on file  Date and time admitted to hospital: 5/26/2022 10:02 PM    WATSON (obstructive sleep apnea)  Assessment & Plan  · Continue CPAP    COPD (chronic obstructive pulmonary disease) ([facility])  Assessment & Plan  · Continue home inhalers      Elevated troponin  Assessment & Plan    Non mi elevation troponin  No current chest pain  No further eval necessary      RLL pneumonia  Assessment & Plan  · Presented initally with fever, cough, and SOB  · Found to have RLL pneumonia on cxr  · Clinically improved  · Will transition oral antibiotics    * Sepsis ([address] )  Assessment & Plan  Presented with leukocytosis and fever  2/2 to RLL pneumonia  Has since resolved        Discharging Physician / Practitioner: Isatu Vera MD  PCP: No primary care provider on file  Admission Date:   Admission Orders (From admission, onward)     Ordered        05/26/22 2342  Inpatient Admission  Once                      Discharge Date: 05/29/22    Medical Problems             Resolved Problems  Date Reviewed: 5/29/2022   None                 Consultations During Hospital Stay:  · none    Procedures Performed:   · none    Significant Findings / Test Results:   · none    Incidental Findings:   · none     Test Results Pending at Discharge (will require follow up):   · noen     Outpatient Tests Requested:  · none    Complications:  none    Reason for Admission:  Sepsis secondary pneumonia    Hospital Course:     Marie Strauss is a 66 y o  female patient who originally presented to the hospital on 5/26/2022 due to sepsis secondary pneumonia  Treated with IV antibiotics significant improvement transition oral antibiotics  Will follow-up outpatient with PCP      Please see above list of diagnoses and related plan for additional information       Condition at Discharge: stable     Discharge Day Visit / Exam:     Subjective:  Denies chest pain, shortness breath, cough, fevers  Vitals: Blood Pressure: 110/56 (05/29/22 0718)  Pulse: 66 (05/29/22 0718)  Temperature: 97 5 °F (36 4 °C) (05/29/22 0718)  Temp Source: Oral (05/28/22 1423)  Respirations: 16 (05/29/22 0223)  Height: 5' 3" (160 cm) (05/27/22 0034)  Weight - Scale: 73 3 kg (161 lb 9 6 oz) (05/27/22 0034)  SpO2: 98 % (05/29/22 0718)  Exam:   Physical Exam  Constitutional:       General: She is not in acute distress  Appearance: She is well-developed  She is not diaphoretic  HENT:      Head: Normocephalic and atraumatic  Nose: Nose normal       Mouth/Throat:      Pharynx: No oropharyngeal exudate  Eyes:      General: No scleral icterus  Right eye: No discharge  Left eye: No discharge  Conjunctiva/sclera: Conjunctivae normal    Neck:      Thyroid: No thyromegaly  Vascular: No JVD  Cardiovascular:      Rate and Rhythm: Normal rate and regular rhythm  Heart sounds: Normal heart sounds  No murmur heard  No friction rub  No gallop  Pulmonary:      Effort: Pulmonary effort is normal  No respiratory distress  Breath sounds: Normal breath sounds  No wheezing or rales  Chest:      Chest wall: No tenderness  Abdominal:      General: Bowel sounds are normal  There is no distension  Palpations: Abdomen is soft  Tenderness: There is no abdominal tenderness  There is no guarding or rebound  Musculoskeletal:         General: No tenderness or deformity  Normal range of motion  Cervical back: Normal range of motion and neck supple  Skin:     General: Skin is warm and dry  Findings: No erythema or rash  Neurological:      Mental Status: She is alert  Mental status is at baseline  Cranial Nerves: No cranial nerve deficit  Sensory: No sensory deficit  Motor: No abnormal muscle tone        Coordination: Coordination normal          Discussion with Family: pty    Discharge instructions/Information to patient and family:   See after visit summary for information provided to patient and family  Provisions for Follow-Up Care:  See after visit summary for information related to follow-up care and any pertinent home health orders  Disposition:     Home    For Discharges to [facility] SNF:   · Not Applicable to this Patient - Not Applicable to this Patient    Planned Readmission: none     Discharge Statement:  I spent 60 minutes discharging the patient  This time was spent on the day of discharge  I had direct contact with the patient on the day of discharge  Greater than 50% of the total time was spent examining patient, answering all patient questions, arranging and discussing plan of care with patient as well as directly providing post-discharge instructions  Additional time then spent on discharge activities  Discharge Medications:  See after visit summary for reconciled discharge medications provided to patient and family        ** Please Note: This note has been constructed using a voice recognition system **

## 2022-05-29 NOTE — RESPIRATORY THERAPY NOTE
05/28/22 3010   Respiratory Assessment   Resp Comments placed pt on cpap for HS use   O2 Device v30 Eeyore   Non-Invasive Information   O2 Interface Device Face mask   Non-Invasive Ventilation Mode CPAP   $ Intermittent NIV Yes   SpO2 96 %   $ Pulse Oximetry Spot Check Charge Completed   Non-Invasive Settings   FiO2 (%) 21   PEEP/CPAP (cm H2O) (S)  9  (pt asked to be reduced to 9)   Rise Time 2  (c flex)   Humidification   (n/a)   Temperature (Set)   (n/a)   Non-Invasive Readings   Skin Intervention Skin intact   Total Rate 24   Spontaneous Vt (mL) 667   Spontaneous MV (mL) 8 7   I/E Ratio (Obs) 1:3 2   Leak (lpm) 27   Non-Invasive Alarms   MV Low (L/min) 2   High Resp Rate (BPM) 40 BPM   Apnea Interval (sec) 30

## 2022-06-01 LAB
BACTERIA BLD CULT: NORMAL
BACTERIA BLD CULT: NORMAL

## 2022-06-01 NOTE — ED PROVIDER NOTES
History  Chief Complaint   Patient presents with    Cough     Pt reports increased coughing, abdominal pain, chills, generalized body weakness, sent by urgent care for evaluation     28-year-old female presents emergency department for evaluation cough  Patient reports increased cough, abdominal pain, chills, generalized body ache, progressive over the past 2-3 days, initially presents to urgent care, was referred to the emergency department for further evaluation  Denies chest pain  Denies shortness of breath  Is nauseous but has not vomited  Prior to Admission Medications   Prescriptions Last Dose Informant Patient Reported? Taking? Breztri Aerosphere 160-9-4 8 MCG/ACT AERO 5/26/2022 at Unknown time  Yes Yes   Sig: Inhale 2 puffs 2 (two) times a day   Eliquis 5 MG 5/26/2022 at Unknown time  Yes Yes   Sig: Take 5 mg by mouth 2 (two) times a day   atorvastatin (LIPITOR) 80 mg tablet 5/26/2022 at Unknown time  Yes Yes   Sig: Take 80 mg by mouth daily at bedtime   furosemide (LASIX) 20 mg tablet 5/26/2022 at Unknown time  Yes Yes   Sig: Take 20 mg by mouth daily   levothyroxine 137 mcg tablet 5/26/2022 at Unknown time  Yes Yes   Sig: Take 137 mcg by mouth daily   metoprolol succinate (TOPROL-XL) 100 mg 24 hr tablet 5/26/2022 at Unknown time  Yes Yes   Sig: Take 100 mg by mouth daily at bedtime   triamcinolone (KENALOG) 0 5 % ointment 5/26/2022 at Unknown time  Yes Yes   Sig: Apply 1 application topically 2 (two) times a day as needed      Facility-Administered Medications: None       Past Medical History:   Diagnosis Date    Abnormal cardiac valve     Pacemaker        History reviewed  No pertinent surgical history  History reviewed  No pertinent family history  I have reviewed and agree with the history as documented      E-Cigarette/Vaping    E-Cigarette Use Never User      E-Cigarette/Vaping Substances    Nicotine No     THC No     CBD No     Flavoring No     Other No     Unknown No Social History     Tobacco Use    Smoking status: Never Smoker    Smokeless tobacco: Never Used   Vaping Use    Vaping Use: Never used   Substance Use Topics    Alcohol use: Never    Drug use: Never       Review of Systems   Constitutional: Positive for chills  Respiratory: Positive for cough  Musculoskeletal: Positive for myalgias  Physical Exam  Physical Exam  Vitals and nursing note reviewed  Constitutional:       General: She is not in acute distress  Appearance: She is well-developed  She is not diaphoretic  HENT:      Head: Normocephalic and atraumatic  Eyes:      Pupils: Pupils are equal, round, and reactive to light  Neck:      Vascular: No JVD  Trachea: No tracheal deviation  Cardiovascular:      Rate and Rhythm: Normal rate and regular rhythm  Heart sounds: Normal heart sounds  No murmur heard  No friction rub  No gallop  Pulmonary:      Effort: Pulmonary effort is normal  No respiratory distress  Breath sounds: Normal breath sounds  No wheezing or rales  Abdominal:      General: Bowel sounds are normal  There is no distension  Palpations: Abdomen is soft  Tenderness: There is no abdominal tenderness  There is no guarding or rebound  Skin:     General: Skin is warm and dry  Coloration: Skin is not pale  Neurological:      Mental Status: She is alert and oriented to person, place, and time  Cranial Nerves: No cranial nerve deficit  Motor: No abnormal muscle tone     Psychiatric:         Behavior: Behavior normal          Vital Signs  ED Triage Vitals   Temperature Pulse Respirations Blood Pressure SpO2   05/26/22 2034 05/26/22 2034 05/26/22 2034 05/26/22 2034 05/26/22 2207   98 °F (36 7 °C) 70 18 120/56 98 %      Temp Source Heart Rate Source Patient Position - Orthostatic VS BP Location FiO2 (%)   05/26/22 2034 05/26/22 2034 05/26/22 2034 05/26/22 2034 --   Oral Monitor Sitting Left arm       Pain Score       05/27/22 0034 No Pain           Vitals:    05/28/22 2213 05/29/22 0223 05/29/22 0718 05/29/22 1058   BP: 105/59 105/57 110/56 109/55   Pulse: 71 63 66 60   Patient Position - Orthostatic VS: Lying            Visual Acuity      ED Medications  Medications   furosemide (LASIX) injection 20 mg (20 mg Intravenous Given 5/26/22 2309)   ceftriaxone (ROCEPHIN) 1 g/50 mL in dextrose IVPB (0 mg Intravenous Stopped 5/26/22 2328)   azithromycin (ZITHROMAX) 500 mg in sodium chloride 0 9% 250mL IVPB 500 mg (0 mg Intravenous Stopped 5/27/22 0028)       Diagnostic Studies  Results Reviewed     Procedure Component Value Units Date/Time    Blood culture [[id number]] Collected: 05/26/22 2226    Lab Status: Preliminary result Specimen: Blood from Hand, Right Updated: 05/31/22 0804     Blood Culture No Growth After 4 Days  Blood culture [[id number]] Collected: 05/26/22 2226    Lab Status: Preliminary result Specimen: Blood from Arm, Left Updated: 05/31/22 0804     Blood Culture No Growth After 4 Days      Comprehensive metabolic panel, AM Draw, Tomorrow [[id number]]  (Abnormal) Collected: 05/28/22 0447    Lab Status: Final result Specimen: Blood from Arm, Right Updated: 05/28/22 0547     Sodium 138 mmol/L      Potassium 3 8 mmol/L      Chloride 103 mmol/L      CO2 27 mmol/L      ANION GAP 8 mmol/L      BUN 11 mg/dL      Creatinine 0 82 mg/dL      Glucose 112 mg/dL      Calcium 9 4 mg/dL      Corrected Calcium 10 1 mg/dL      AST 23 U/L      ALT 28 U/L      Alkaline Phosphatase 74 U/L      Total Protein 6 7 g/dL      Albumin 3 1 g/dL      Total Bilirubin 1 37 mg/dL      eGFR 71 ml/min/1 73sq m     Narrative:      Kevin guidelines for Chronic Kidney Disease (CKD):     Stage 1 with normal or high GFR (GFR > 90 mL/min/1 73 square meters)    Stage 2 Mild CKD (GFR = 60-89 mL/min/1 73 square meters)    Stage 3A Moderate CKD (GFR = 45-59 mL/min/1 73 square meters)    Stage 3B Moderate CKD (GFR = 30-44 mL/min/1 73 square meters)    Stage 4 Severe CKD (GFR = 15-29 mL/min/1 73 square meters)    Stage 5 End Stage CKD (GFR <15 mL/min/1 73 square meters)  Note: GFR calculation is accurate only with a steady state creatinine    CBC and Platelet, AM Draw, Tomorrow [[id number]]  (Abnormal) Collected: 05/28/22 0447    Lab Status: Final result Specimen: Blood from Arm, Right Updated: 05/28/22 0529     WBC 9 55 Thousand/uL      RBC 3 75 Million/uL      Hemoglobin 11 5 g/dL      Hematocrit 36 1 %      MCV 96 fL      MCH 30 7 pg      MCHC 31 9 g/dL      RDW 13 2 %      Platelets 84 Thousands/uL      MPV 12 1 fL     Procalcitonin [[id number]]  (Abnormal) Collected: 05/27/22 0503    Lab Status: Final result Specimen: Blood from Arm, Right Updated: 05/27/22 0538     Procalcitonin 1 73 ng/ml     Procalcitonin [[id number]]  (Abnormal) Collected: 05/27/22 0205    Lab Status: Final result Specimen: Blood from Arm, Left Updated: 05/27/22 0243     Procalcitonin 1 83 ng/ml     Sputum culture and Gram stain [[id number]]     Lab Status: No result Specimen: Sputum     HS Troponin I 4hr [[id number]]  (Abnormal) Collected: 05/27/22 0034    Lab Status: Final result Specimen: Blood from Arm, Left Updated: 05/27/22 0107     hs TnI 4hr 73 ng/L      Delta 4hr hsTnI -17 ng/L     HS Troponin I 2hr [[id number]]  (Abnormal) Collected: 05/26/22 2308    Lab Status: Final result Specimen: Blood from Arm, Left Updated: 05/26/22 2349     hs TnI 2hr 84 ng/L      Delta 2hr hsTnI -6 ng/L     COVID/FLU/RSV [[id number]]  (Normal) Collected: 05/26/22 2041    Lab Status: Final result Specimen: Nares from Nose Updated: 05/26/22 2154     SARS-CoV-2 Negative     INFLUENZA A PCR Negative     INFLUENZA B PCR Negative     RSV PCR Negative    Narrative:      FOR PEDIATRIC PATIENTS - copy/paste COVID Guidelines URL to browser: [email or web address] org/  [email or web address]    SARS-CoV-2 assay is a Nucleic Acid Amplification assay intended for the  qualitative detection of nucleic acid from SARS-CoV-2 in nasopharyngeal  swabs  Results are for the presumptive identification of SARS-CoV-2 RNA  Positive results are indicative of infection with SARS-CoV-2, the virus  causing COVID-19, but do not rule out bacterial infection or co-infection  with other viruses  Laboratories within the [place] and its  territories are required to report all positive results to the appropriate  public health authorities  Negative results do not preclude SARS-CoV-2  infection and should not be used as the sole basis for treatment or other  patient management decisions  Negative results must be combined with  clinical observations, patient history, and epidemiological information  This test has not been FDA cleared or approved  This test has been authorized by FDA under an Emergency Use Authorization  (EUA)  This test is only authorized for the duration of time the  declaration that circumstances exist justifying the authorization of the  emergency use of an in vitro diagnostic tests for detection of SARS-CoV-2  virus and/or diagnosis of COVID-19 infection under section 564(b)(1) of  the Act, 21 U  S C  981JGZ-7(M)(6), unless the authorization is terminated  or revoked sooner  The test has been validated but independent review by FDA  and CLIA is pending  Test performed using [employer] Gene[employer]pert: This RT-PCR assay targets N2,  a region unique to SARS-CoV-2  A conserved region in the E-gene was chosen  for pan-Sarbecovirus detection which includes SARS-CoV-2      CBC and differential [[id number]]  (Abnormal) Collected: 05/26/22 2041    Lab Status: Final result Specimen: Blood from Arm, Right Updated: 05/26/22 2143     WBC 17 60 Thousand/uL      RBC 4 17 Million/uL      Hemoglobin 12 9 g/dL      Hematocrit 39 8 %      MCV 95 fL      MCH 30 9 pg      MCHC 32 4 g/dL      RDW 13 5 %      MPV 11 3 fL      Platelets 509 Thousands/uL     Manual Differential(PHLEBS Do Not Order) [[id number]] (Abnormal) Collected: 05/26/22 2041    Lab Status: Final result Specimen: Blood from Arm, Right Updated: 05/26/22 2143     Segmented % 85 %      Lymphocytes % 12 %      Monocytes % 3 %      Eosinophils, % 0 %      Basophils % 0 %      Absolute Neutrophils 14 96 Thousand/uL      Lymphocytes Absolute 2 11 Thousand/uL      Monocytes Absolute 0 53 Thousand/uL      Eosinophils Absolute 0 00 Thousand/uL      Basophils Absolute 0 00 Thousand/uL      Total Counted --     Platelet Estimate Borderline    NT-BNP PRO [[id number]]  (Abnormal) Collected: 05/26/22 2041    Lab Status: Final result Specimen: Blood from Arm, Right Updated: 05/26/22 2118     NT-proBNP 3,813 pg/mL     HS Troponin 0hr (reflex protocol) [[id number]]  (Abnormal) Collected: 05/26/22 2041    Lab Status: Final result Specimen: Blood from Arm, Right Updated: 05/26/22 2116     hs TnI 0hr 90 ng/L     Comprehensive metabolic panel [[id number]]  (Abnormal) Collected: 05/26/22 2041    Lab Status: Final result Specimen: Blood from Arm, Right Updated: 05/26/22 2111     Sodium 135 mmol/L      Potassium 3 9 mmol/L      Chloride 100 mmol/L      CO2 27 mmol/L      ANION GAP 8 mmol/L      BUN 18 mg/dL      Creatinine 1 20 mg/dL      Glucose 95 mg/dL      Calcium 9 5 mg/dL      AST 30 U/L      ALT 28 U/L      Alkaline Phosphatase 72 U/L      Total Protein 7 1 g/dL      Albumin 3 6 g/dL      Total Bilirubin 2 20 mg/dL      eGFR 44 ml/min/1 73sq m     Narrative:      Kevin guidelines for Chronic Kidney Disease (CKD):     Stage 1 with normal or high GFR (GFR > 90 mL/min/1 73 square meters)    Stage 2 Mild CKD (GFR = 60-89 mL/min/1 73 square meters)    Stage 3A Moderate CKD (GFR = 45-59 mL/min/1 73 square meters)    Stage 3B Moderate CKD (GFR = 30-44 mL/min/1 73 square meters)    Stage 4 Severe CKD (GFR = 15-29 mL/min/1 73 square meters)    Stage 5 End Stage CKD (GFR <15 mL/min/1 73 square meters)  Note: GFR calculation is accurate only with a steady state creatinine                 XR chest 2 views   Final Result by Radha Dominguez MD (05/26 2309)      Right lower lobe infiltrate and/or atelectasis noted  Workstation performed: [id number]                    Procedures  Procedures         ED Course                               SBIRT 22yo+    Flowsheet Row Most Recent Value   SBIRT (25 yo +)    In order to provide better care to our patients, we are screening all of our patients for alcohol and drug use  Would it be okay to ask you these screening questions? Yes Filed at: 05/26/2022 2214   Initial Alcohol Screen: US AUDIT-C     1  How often do you have a drink containing alcohol? 0 Filed at: 05/26/2022 2214   2  How many drinks containing alcohol do you have on a typical day you are drinking? 0 Filed at: 05/26/2022 2214   3a  Male UNDER 65: How often do you have five or more drinks on one occasion? 0 Filed at: 05/26/2022 2214   3b  FEMALE Any Age, or MALE 65+: How often do you have 4 or more drinks on one occassion? 0 Filed at: 05/26/2022 2214   Audit-C Score 0 Filed at: 05/26/2022 2214   TEJA: How many times in the past year have you    Used an illegal drug or used a prescription medication for non-medical reasons?  Never Filed at: 05/26/2022 2214                    MDM  Number of Diagnoses or Management Options  CHF (congestive heart failure) (HCC)  Pneumonia  Diagnosis management comments: 38-year-old female with chills, fatigue, appears volume overloaded here, possible underlying pneumonia,, will check cardiac workup, BNP, give Lasix, antibiotics, admit      Disposition  Final diagnoses:   CHF (congestive heart failure) ([facility] )   Pneumonia     Time reflects when diagnosis was documented in both MDM as applicable and the Disposition within this note     Time User Action Codes Description Comment    5/26/2022 11:40 PM Allan Cantu [I50 9] CHF (congestive heart failure) ([facility] )     5/26/2022 11:40 PM Allan Cantu [J18 9] Pneumonia 5/29/2022 10:16 AM Sekou Arellano [J18 9] RLL pneumonia       ED Disposition     ED Disposition   Admit    Condition   Stable    Date/Time   Fri May 27, 2022  1:41 AM    Comment   Case was discussed with ROSIO and the patient's admission status was agreed to be Admission Status: inpatient status to the service of Dr Annabella Mccauley   Follow-up Information    None         Discharge Medication List as of 5/29/2022 11:17 AM      START taking these medications    Details   amoxicillin-clavulanate (AUGMENTIN) 875-125 mg per tablet Take 1 tablet by mouth every 12 (twelve) hours for 5 days, Starting Sun 5/29/2022, Until Fri 6/3/2022, Normal         CONTINUE these medications which have NOT CHANGED    Details   atorvastatin (LIPITOR) 80 mg tablet Take 80 mg by mouth daily at bedtime, Starting Tue 5/10/2022, Historical Med      Breztri Aerosphere 160-9-4 8 MCG/ACT AERO Inhale 2 puffs 2 (two) times a day, Starting Tue 5/10/2022, Historical Med      Eliquis 5 MG Take 5 mg by mouth 2 (two) times a day, Starting Mon 3/28/2022, Historical Med      furosemide (LASIX) 20 mg tablet Take 20 mg by mouth daily, Starting Tue 5/10/2022, Historical Med      levothyroxine 137 mcg tablet Take 137 mcg by mouth daily, Starting Sun 2/27/2022, Historical Med      metoprolol succinate (TOPROL-XL) 100 mg 24 hr tablet Take 100 mg by mouth daily at bedtime, Starting Tue 5/10/2022, Historical Med      triamcinolone (KENALOG) 0 5 % ointment Apply 1 application topically 2 (two) times a day as needed, Starting Tue 5/10/2022, Historical Med             No discharge procedures on file      PDMP Review     None          ED Provider  Electronically Signed by           Harley Chakraborty MD  05/31/22 2011

## 2022-06-02 LAB
ATRIAL RATE: 214 BPM
P AXIS: 87 DEGREES
QRS AXIS: -76 DEGREES
QRSD INTERVAL: 166 MS
QT INTERVAL: 480 MS
QTC INTERVAL: 491 MS
T WAVE AXIS: 92 DEGREES
VENTRICULAR RATE: 63 BPM

## 2022-06-02 PROCEDURE — 93010 ELECTROCARDIOGRAM REPORT: CPT | Performed by: INTERNAL MEDICINE

## 2022-06-06 LAB
ATRIAL RATE: 97 BPM
P AXIS: 85 DEGREES
QRS AXIS: -77 DEGREES
QRSD INTERVAL: 170 MS
QT INTERVAL: 492 MS
QTC INTERVAL: 515 MS
T WAVE AXIS: 88 DEGREES
VENTRICULAR RATE: 66 BPM

## 2022-06-06 PROCEDURE — 93010 ELECTROCARDIOGRAM REPORT: CPT | Performed by: INTERNAL MEDICINE

## 2022-08-04 ENCOUNTER — OFFICE VISIT (OUTPATIENT)
Dept: URGENT CARE | Facility: CLINIC | Age: 72
End: 2022-08-04
Payer: MEDICARE

## 2022-08-04 VITALS
HEIGHT: 63 IN | DIASTOLIC BLOOD PRESSURE: 80 MMHG | TEMPERATURE: 96.9 F | RESPIRATION RATE: 18 BRPM | BODY MASS INDEX: 26.58 KG/M2 | HEART RATE: 79 BPM | OXYGEN SATURATION: 97 % | WEIGHT: 150 LBS | SYSTOLIC BLOOD PRESSURE: 110 MMHG

## 2022-08-04 DIAGNOSIS — U07.1 COVID-19: Primary | ICD-10-CM

## 2022-08-04 PROCEDURE — 99213 OFFICE O/P EST LOW 20 MIN: CPT | Performed by: FAMILY MEDICINE

## 2022-08-04 NOTE — PROGRESS NOTES
[facility]        NAME: Alecia Cleary is a 66 y o  female  : 1950    MRN: [id number]  DATE: 2022  TIME: 12:41 PM    Assessment and Plan   COVID-19 [U07 1]  1  COVID-19  nirmatrelvir & ritonavir (Paxlovid) tablet therapy pack     Treatment options reviewed and patient desires to move forward with Paxlovid  Advised to decrease Eliquis dosing from b i d  to once daily and to temporarily stop atorvastatin during the course of treatment  Patient Instructions     Follow up with PCP in 3-5 days  Proceed to  ER if symptoms worsen  Chief Complaint     Chief Complaint   Patient presents with    Cough    Fever    COVID-19     X 1 week - scratchy throat  X 2 days - worsening cough with fatigue, body aches, fever 100 8 max  Had + COVID test on Tuesday  History of Present Illness     76-year-old female presents today with a diagnosis of COVID-19 confirmed by a rapid antigen test performed at home 2 days ago  The day prior to that, she started experiencing anorexia, fatigue, dyspnea with exertion, arthralgias, lightheadedness and headaches  Prior to this, she ate only been experiencing a scratchy throat; likely a postnasal drip thought to be due to a common cold  Of note, she was recently admitted 3 months ago due to pneumonia  Cough  Associated symptoms include headaches, postnasal drip and shortness of breath (with exertion)  Pertinent negatives include no chest pain  Fever  Associated symptoms include arthralgias, coughing, fatigue and headaches  Pertinent negatives include no abdominal pain, chest pain or nausea  Review of Systems   Review of Systems   Constitutional: Positive for appetite change and fatigue  HENT: Positive for postnasal drip  Respiratory: Positive for cough and shortness of breath (with exertion)  Cardiovascular: Negative for chest pain  Gastrointestinal: Negative for abdominal pain and nausea     Musculoskeletal: Positive for arthralgias  Neurological: Positive for light-headedness and headaches  Negative for dizziness  Current Medications       Current Outpatient Medications:     atorvastatin (LIPITOR) 80 mg tablet, Take 80 mg by mouth daily at bedtime, Disp: , Rfl:     Breztri Aerosphere 160-9-4 8 MCG/ACT AERO, Inhale 2 puffs 2 (two) times a day, Disp: , Rfl:     Eliquis 5 MG, Take 5 mg by mouth 2 (two) times a day, Disp: , Rfl:     furosemide (LASIX) 20 mg tablet, Take 20 mg by mouth daily, Disp: , Rfl:     levothyroxine 137 mcg tablet, Take 137 mcg by mouth daily, Disp: , Rfl:     metoprolol succinate (TOPROL-XL) 100 mg 24 hr tablet, Take 100 mg by mouth daily at bedtime, Disp: , Rfl:     nirmatrelvir & ritonavir (Paxlovid) tablet therapy pack, Take 3 tablets by mouth 2 (two) times a day for 5 days Take 2 nirmatrelvir tablets + 1 ritonavir tablet together per dose, Disp: 30 tablet, Rfl: 0    triamcinolone (KENALOG) 0 5 % ointment, Apply 1 application topically 2 (two) times a day as needed, Disp: , Rfl:     Current Allergies     Allergies as of 08/04/2022 - Reviewed 05/26/2022   Allergen Reaction Noted    Tramadol Dizziness 05/26/2022            The following portions of the patient's history were reviewed and updated as appropriate: allergies, current medications, past family history, past medical history, past social history, past surgical history and problem list      Past Medical History:   Diagnosis Date    Abnormal cardiac valve     Atrial fibrillation ([address] )     Pacemaker     Pneumonia        Past Surgical History:   Procedure Laterality Date    ADENOIDECTOMY      CARDIAC SURGERY      dual chamber pacemaker    KNEE ARTHROPLASTY Left     cartilage    TONSILLECTOMY         No family history on file  Medications have been verified  Objective   /80   Pulse 79   Temp (!) 96 9 °F (36 1 °C)   Resp 18   Ht 5' 3" (1 6 m)   Wt 68 kg (150 lb)   SpO2 97%   BMI 26 57 kg/m²   No LMP recorded  Patient is postmenopausal        Physical Exam     Physical Exam  Vitals and nursing note reviewed  Constitutional:       General: She is in acute distress  Appearance: Normal appearance  She is normal weight  She is not ill-appearing, toxic-appearing or diaphoretic  HENT:      Head: Normocephalic and atraumatic  Eyes:      General:         Right eye: No discharge  Left eye: No discharge  Conjunctiva/sclera: Conjunctivae normal    Pulmonary:      Effort: Pulmonary effort is normal  No respiratory distress  Breath sounds: Normal breath sounds  No wheezing, rhonchi or rales  Skin:     General: Skin is warm  Findings: No erythema  Neurological:      General: No focal deficit present  Mental Status: She is alert and oriented to person, place, and time  Psychiatric:         Mood and Affect: Mood normal          Behavior: Behavior normal          Thought Content:  Thought content normal          Judgment: Judgment normal

## 2022-10-11 PROBLEM — A41.9 SEPSIS (HCC): Status: RESOLVED | Noted: 2022-05-27 | Resolved: 2022-10-11

## 2022-10-11 PROBLEM — J18.9 RLL PNEUMONIA: Status: RESOLVED | Noted: 2022-05-27 | Resolved: 2022-10-11

## 2022-12-23 ENCOUNTER — OFFICE VISIT (OUTPATIENT)
Dept: URGENT CARE | Facility: CLINIC | Age: 72
End: 2022-12-23

## 2022-12-23 ENCOUNTER — APPOINTMENT (OUTPATIENT)
Dept: RADIOLOGY | Facility: CLINIC | Age: 72
End: 2022-12-23

## 2022-12-23 VITALS
OXYGEN SATURATION: 97 % | RESPIRATION RATE: 18 BRPM | DIASTOLIC BLOOD PRESSURE: 60 MMHG | BODY MASS INDEX: 26.58 KG/M2 | SYSTOLIC BLOOD PRESSURE: 110 MMHG | HEIGHT: 63 IN | TEMPERATURE: 97.1 F | HEART RATE: 72 BPM | WEIGHT: 150 LBS

## 2022-12-23 DIAGNOSIS — S89.92XA LEG INJURY, LEFT, INITIAL ENCOUNTER: ICD-10-CM

## 2022-12-23 DIAGNOSIS — M25.562 ACUTE PAIN OF LEFT KNEE: ICD-10-CM

## 2022-12-23 DIAGNOSIS — S89.92XA LEG INJURY, LEFT, INITIAL ENCOUNTER: Primary | ICD-10-CM

## 2022-12-23 NOTE — PROGRESS NOTES
[facility]        NAME: Nila Pruitt is a 74 y o  female  : 1950    MRN: [id number]  DATE: 2022  TIME: 12:54 PM    Assessment and Plan   Leg injury, left, initial encounter [S89 92XA]  1  Leg injury, left, initial encounter  XR tibia fibula 2 vw left      2  Acute pain of left knee  XR knee 4+ vw left injury        No acute findings on knee and left leg x-rays; official read pending  Advised on supportive measures including icing, rest, elevation and OTC pain relievers  Patient Instructions     Follow up with PCP in 3-5 days  Proceed to  ER if symptoms worsen  Chief Complaint     Chief Complaint   Patient presents with   • Fall   • Leg Injury     Tripped and fell outside on to 2 days ago  Fell onto L knee and L shin hot rocks  Has bruise and swelling lower L shin with swollen and L knee swollen with bruising and tightness behind knee  Walking with limp  Used ice and Tylenol  History of Present Illness       75-year-old female presents today due to left lower extremity injury sustained 2 days ago  She was walking around on her property and tripped on some rocks in the dark of night  She sustained injuries to the left shin and knee resulting in abrasions and swelling  Has been applying ice and has noticed some improvement in the swelling and pain in both areas  However is concerned due to stabbing sensation within the shin prompting her evaluation today  Review of Systems   Review of Systems   Constitutional: Negative for chills and fever  Respiratory: Negative for cough, shortness of breath and wheezing  Cardiovascular: Negative for chest pain  Gastrointestinal: Negative for abdominal pain and nausea  Musculoskeletal: Positive for arthralgias, gait problem and joint swelling  Skin: Positive for color change and wound       Current Medications       Current Outpatient Medications:   •  atorvastatin (LIPITOR) 80 mg tablet, Take 80 mg by mouth daily at bedtime, Disp: , Rfl:   •  Breztri Aerosphere 160-9-4 8 MCG/ACT AERO, Inhale 2 puffs 2 (two) times a day, Disp: , Rfl:   •  Eliquis 5 MG, Take 5 mg by mouth 2 (two) times a day, Disp: , Rfl:   •  furosemide (LASIX) 20 mg tablet, Take 20 mg by mouth daily, Disp: , Rfl:   •  levothyroxine 137 mcg tablet, Take 137 mcg by mouth daily, Disp: , Rfl:   •  metoprolol succinate (TOPROL-XL) 100 mg 24 hr tablet, Take 100 mg by mouth daily at bedtime, Disp: , Rfl:   •  triamcinolone (KENALOG) 0 5 % ointment, Apply 1 application topically 2 (two) times a day as needed, Disp: , Rfl:     Current Allergies     Allergies as of 12/23/2022 - Reviewed 08/04/2022   Allergen Reaction Noted   • Tramadol Dizziness 05/26/2022            The following portions of the patient's history were reviewed and updated as appropriate: allergies, current medications, past family history, past medical history, past social history, past surgical history and problem list      Past Medical History:   Diagnosis Date   • Abnormal cardiac valve    • Atrial fibrillation ([address] )    • Pacemaker    • Pneumonia        Past Surgical History:   Procedure Laterality Date   • ADENOIDECTOMY     • CARDIAC SURGERY      dual chamber pacemaker   • KNEE ARTHROPLASTY Left     cartilage   • TONSILLECTOMY         No family history on file  Medications have been verified  Objective   /60   Pulse 72   Temp (!) 97 1 °F (36 2 °C)   Resp 18   Ht 5' 3" (1 6 m)   Wt 68 kg (150 lb)   SpO2 97%   BMI 26 57 kg/m²   No LMP recorded  Patient is postmenopausal        Physical Exam     Physical Exam  Vitals and nursing note reviewed  Constitutional:       General: She is in acute distress  Appearance: Normal appearance  She is obese  She is not ill-appearing, toxic-appearing or diaphoretic  HENT:      Head: Normocephalic and atraumatic  Eyes:      General:         Right eye: No discharge  Left eye: No discharge        Conjunctiva/sclera: Conjunctivae normal    Pulmonary:      Effort: Pulmonary effort is normal    Musculoskeletal:         General: Swelling (Over the mid shin with some ecchymosis), tenderness (Left tibia diaphysis and distal patella) and signs of injury present  No deformity  Normal range of motion  Comments: Mild abrasions  Negative FADIR/АЛЕКСАНДР of left hip; nontender  Skin:     General: Skin is warm  Findings: Bruising and lesion present  No erythema  Neurological:      General: No focal deficit present  Mental Status: She is alert and oriented to person, place, and time  Psychiatric:         Mood and Affect: Mood normal          Behavior: Behavior normal          Thought Content:  Thought content normal          Judgment: Judgment normal

## 2024-05-02 ENCOUNTER — OFFICE VISIT (OUTPATIENT)
Dept: URGENT CARE | Facility: CLINIC | Age: 74
End: 2024-05-02
Payer: MEDICARE

## 2024-05-02 ENCOUNTER — APPOINTMENT (OUTPATIENT)
Dept: RADIOLOGY | Facility: CLINIC | Age: 74
End: 2024-05-02
Payer: MEDICARE

## 2024-05-02 VITALS
WEIGHT: 177 LBS | HEIGHT: 62 IN | DIASTOLIC BLOOD PRESSURE: 70 MMHG | RESPIRATION RATE: 18 BRPM | BODY MASS INDEX: 32.57 KG/M2 | OXYGEN SATURATION: 97 % | HEART RATE: 80 BPM | TEMPERATURE: 99.6 F | SYSTOLIC BLOOD PRESSURE: 122 MMHG

## 2024-05-02 DIAGNOSIS — R05.1 ACUTE COUGH: Primary | ICD-10-CM

## 2024-05-02 DIAGNOSIS — R05.1 ACUTE COUGH: ICD-10-CM

## 2024-05-02 PROCEDURE — 71046 X-RAY EXAM CHEST 2 VIEWS: CPT

## 2024-05-02 PROCEDURE — 99213 OFFICE O/P EST LOW 20 MIN: CPT | Performed by: FAMILY MEDICINE

## 2024-05-02 PROCEDURE — G2211 COMPLEX E/M VISIT ADD ON: HCPCS | Performed by: FAMILY MEDICINE

## 2024-05-02 RX ORDER — AZITHROMYCIN 250 MG/1
TABLET, FILM COATED ORAL
Qty: 6 TABLET | Refills: 0 | Status: SHIPPED | OUTPATIENT
Start: 2024-05-02 | End: 2024-05-06

## 2024-05-02 RX ORDER — BUMETANIDE 0.5 MG/1
0.5 TABLET ORAL DAILY
COMMUNITY
Start: 2024-02-12

## 2024-05-02 RX ORDER — BENZONATATE 200 MG/1
200 CAPSULE ORAL 3 TIMES DAILY PRN
Qty: 20 CAPSULE | Refills: 0 | Status: SHIPPED | OUTPATIENT
Start: 2024-05-02

## 2024-05-02 RX ORDER — FLUTICASONE PROPIONATE 50 MCG
1 SPRAY, SUSPENSION (ML) NASAL 2 TIMES DAILY
Qty: 16 G | Refills: 0 | Status: SHIPPED | OUTPATIENT
Start: 2024-05-02 | End: 2024-05-05

## 2024-05-02 NOTE — PROGRESS NOTES
[facility]        NAME: Lily Sanchez is a 73 y.o. female  : 1950    MRN: [id number]  DATE: May 2, 2024  TIME: 2:28 PM    Assessment and Plan   Acute cough [R05.1]  1. Acute cough  XR chest pa & lateral    benzonatate (TESSALON) 200 MG capsule    azithromycin (ZITHROMAX) 250 mg tablet    fluticasone (FLONASE) 50 mcg/act nasal spray        Chest x-ray unremarkable, but does not rule out the presence of pneumonia in the elderly; official read pending.  Fevers and chills indicate an infectious etiology.  Will therefore treat with azithromycin to cover for bacterial pneumonia and for its anti-inflammatory effect in case of a viral infection.  Tessalon Perles as needed for cough suppression and Flonase to counteract postnasal drip.    Patient Instructions     Follow up with PCP in 3-5 days.  Proceed to  ER if symptoms worsen.    If tests have been performed at [facility], our office will contact you with results if changes need to be made to the care plan discussed with you at the visit.  [facility].    Chief Complaint     Chief Complaint   Patient presents with    Cold Like Symptoms     C/O cough,nasal congestion, fatigue, loss of appetite and legs feeling weak. The patient stated on  a new pacemaker was put in. No OTC medication.         History of Present Illness       73-year-old female presents today with over a week of flulike symptoms including fatigue, low-grade fevers, chills, coughing, nasal congestion, weakness and transient lightheadedness.  This started during a [employer] mission trip to [facility] where she just returned yesterday morning.      Review of Systems   Review of Systems   Constitutional:  Positive for appetite change, chills, fatigue and fever (100.7). Negative for diaphoresis.   HENT:  Positive for congestion and rhinorrhea.    Respiratory:  Positive for cough. Negative for shortness of breath.    Cardiovascular:   Negative for chest pain and palpitations.   Gastrointestinal:  Negative for abdominal pain, diarrhea and nausea.   Neurological:  Positive for light-headedness (transient). Negative for dizziness and headaches.     Current Medications       Current Outpatient Medications:     atorvastatin (LIPITOR) 80 mg tablet, Take 80 mg by mouth daily at bedtime, Disp: , Rfl:     azithromycin (ZITHROMAX) 250 mg tablet, Take 2 tablets today then 1 tablet daily x 4 days, Disp: 6 tablet, Rfl: 0    benzonatate (TESSALON) 200 MG capsule, Take 1 capsule (200 mg total) by mouth 3 (three) times a day as needed for cough, Disp: 20 capsule, Rfl: 0    Breztri Aerosphere 160-9-4.8 MCG/ACT AERO, Inhale 2 puffs 2 (two) times a day, Disp: , Rfl:     bumetanide (BUMEX) 0.5 MG tablet, Take 0.5 mg by mouth daily, Disp: , Rfl:     Eliquis 5 MG, Take 5 mg by mouth 2 (two) times a day, Disp: , Rfl:     fluticasone (FLONASE) 50 mcg/act nasal spray, 1 spray into each nostril 2 (two) times a day for 3 days, Disp: 16 g, Rfl: 0    levothyroxine 137 mcg tablet, Take 137 mcg by mouth daily, Disp: , Rfl:     metoprolol succinate (TOPROL-XL) 100 mg 24 hr tablet, Take 100 mg by mouth daily at bedtime, Disp: , Rfl:     triamcinolone (KENALOG) 0.5 % ointment, Apply 1 application topically 2 (two) times a day as needed, Disp: , Rfl:     furosemide (LASIX) 20 mg tablet, Take 20 mg by mouth daily (Patient not taking: Reported on 5/2/2024), Disp: , Rfl:     Current Allergies     Allergies as of 05/02/2024 - Reviewed 05/02/2024   Allergen Reaction Noted    Tramadol Dizziness 05/26/2022            The following portions of the patient's history were reviewed and updated as appropriate: allergies, current medications, past family history, past medical history, past social history, past surgical history and problem list.     Past Medical History:   Diagnosis Date    Abnormal cardiac valve     Atrial fibrillation (HCC)     Pacemaker     Pneumonia        Past Surgical  "History:   Procedure Laterality Date    ADENOIDECTOMY      CARDIAC SURGERY      dual chamber pacemaker    KNEE ARTHROPLASTY Left     cartilage    TONSILLECTOMY         History reviewed. No pertinent family history.      Medications have been verified.        Objective   /70   Pulse 80   Temp 99.6 °F (37.6 °C)   Resp 18   Ht 5' 2\" (1.575 m)   Wt 80.3 kg (177 lb)   SpO2 97%   BMI 32.37 kg/m²   No LMP recorded. Patient is postmenopausal.       Physical Exam     Physical Exam  Vitals and nursing note reviewed.   Constitutional:       General: She is in acute distress.      Appearance: Normal appearance. She is normal weight. She is not ill-appearing or toxic-appearing.   HENT:      Head: Normocephalic and atraumatic.      Nose: Congestion present. No rhinorrhea.      Mouth/Throat:      Mouth: Mucous membranes are moist.      Pharynx: No posterior oropharyngeal erythema.   Eyes:      General:         Right eye: No discharge.         Left eye: No discharge.      Conjunctiva/sclera: Conjunctivae normal.   Cardiovascular:      Rate and Rhythm: Normal rate and regular rhythm.      Heart sounds: Murmur heard.   Pulmonary:      Effort: Pulmonary effort is normal. No respiratory distress.      Breath sounds: Normal breath sounds. No wheezing, rhonchi or rales.   Skin:     General: Skin is warm.      Findings: No erythema.   Neurological:      General: No focal deficit present.      Mental Status: She is alert and oriented to person, place, and time.   Psychiatric:         Mood and Affect: Mood normal.         Behavior: Behavior normal.         Thought Content: Thought content normal.         Judgment: Judgment normal.                   "

## 2025-02-24 ENCOUNTER — HOSPITAL ENCOUNTER (EMERGENCY)
Facility: HOSPITAL | Age: 75
Discharge: HOME | End: 2025-02-24
Attending: EMERGENCY MEDICINE | Admitting: EMERGENCY MEDICINE
Payer: COMMERCIAL

## 2025-02-24 ENCOUNTER — APPOINTMENT (EMERGENCY)
Dept: RADIOLOGY | Facility: HOSPITAL | Age: 75
End: 2025-02-24
Payer: COMMERCIAL

## 2025-02-24 VITALS
OXYGEN SATURATION: 98 % | HEART RATE: 82 BPM | WEIGHT: 173.2 LBS | RESPIRATION RATE: 18 BRPM | SYSTOLIC BLOOD PRESSURE: 121 MMHG | BODY MASS INDEX: 31.87 KG/M2 | DIASTOLIC BLOOD PRESSURE: 68 MMHG | HEIGHT: 62 IN | TEMPERATURE: 98.4 F

## 2025-02-24 DIAGNOSIS — S80.10XA MULTIPLE LEG CONTUSIONS: Primary | ICD-10-CM

## 2025-02-24 PROCEDURE — 99284 EMERGENCY DEPT VISIT MOD MDM: CPT

## 2025-02-24 PROCEDURE — 73564 X-RAY EXAM KNEE 4 OR MORE: CPT

## 2025-02-24 PROCEDURE — 73502 X-RAY EXAM HIP UNI 2-3 VIEWS: CPT

## 2025-02-24 PROCEDURE — 73590 X-RAY EXAM OF LOWER LEG: CPT

## 2025-02-24 PROCEDURE — 73552 X-RAY EXAM OF FEMUR 2/>: CPT

## 2025-02-24 PROCEDURE — 99285 EMERGENCY DEPT VISIT HI MDM: CPT | Performed by: EMERGENCY MEDICINE

## 2025-02-24 PROCEDURE — 70450 CT HEAD/BRAIN W/O DYE: CPT

## 2025-02-24 NOTE — DISCHARGE INSTRUCTIONS
X-rays today were normal.  Continue Ace bandage for support and comfort.  You can use Tylenol for pain control.  Keep the leg elevated.  If you have any severe worsening of pain, new weakness or numbness, severe headache, confusion, return to the emergency department.

## 2025-02-24 NOTE — ED PROVIDER NOTES
Time reflects when diagnosis was documented in both MDM as applicable and the Disposition within this note       Time User Action Codes Description Comment    2/24/2025  2:33 PM Aquilino Lopez Add [S80.10XA] Multiple leg contusions           ED Disposition       ED Disposition   Discharge    Condition   Stable    Date/Time   Mon Feb 24, 2025  2:33 PM    Comment   Lily Sanchez discharge to home/self care.                   Assessment & Plan       Medical Decision Making  Given patient's fall and head strike on Eliquis, I ordered and reviewed a CT head to evaluate for intracranial hemorrhage.  Patient with bony tenderness of the hip, femur, knee, proximal tibia.  I ordered and independently interpreted plain films to evaluate for traumatic injuries of these areas.  X-rays and head CT atraumatic.  Provided with an Ace wrap.  Provided with instructions on symptomatic relief, reassurance, discharged with return precautions.    Amount and/or Complexity of Data Reviewed  External Data Reviewed: notes.     Details: Reviewed and noncontributory.  Radiology: ordered.             Medications - No data to display    ED Risk Strat Scores                                                History of Present Illness       Chief Complaint   Patient presents with    Knee Pain     Approx 1 week ago stepping off curb onto ice and slipped onto right knee and head. Head strike noted, no LOC .  C/o of swelling and pain left hip and left knee . On [facility]        Past Medical History:   Diagnosis Date    Abnormal cardiac valve     Atrial fibrillation (HCC)     Pacemaker     Pneumonia       Past Surgical History:   Procedure Laterality Date    ADENOIDECTOMY      CARDIAC SURGERY      dual chamber pacemaker    KNEE ARTHROPLASTY Left     cartilage    TONSILLECTOMY        No family history on file.   Social History     Tobacco Use    Smoking status: Former    Smokeless tobacco: Never   Vaping Use    Vaping status: Never Used   Substance Use  Topics    Alcohol use: Never    Drug use: Never      E-Cigarette/Vaping    E-Cigarette Use Never User       E-Cigarette/Vaping Substances    Nicotine No     THC No     CBD No     Flavoring No     Other No     Unknown No       I have reviewed and agree with the history as documented.     Patient is a 74-year-old female history of CHF chronically anticoagulated on Eliquis presenting for evaluation of right hip, thigh, knee, shin pain and swelling after fall.  Patient states that 9 days ago she slipped and fell on the ice landing on her knee then hitting her head on her car.  Patient denies loss of consciousness.  Patient denies headache, neck pain, back pain.  Patient complains of moderate generalized pain of her right leg with some associated swelling.  Patient remains ambulatory despite the pain.        Review of Systems   Constitutional:  Negative for chills, fatigue and fever.   Respiratory:  Negative for cough and shortness of breath.    Cardiovascular:  Positive for leg swelling.   Gastrointestinal:  Negative for diarrhea, nausea and vomiting.   Musculoskeletal:  Positive for arthralgias. Negative for myalgias.   Neurological:  Negative for headaches.   Psychiatric/Behavioral:  Negative for confusion.    All other systems reviewed and are negative.          Objective       ED Triage Vitals   Temperature Pulse Blood Pressure Respirations SpO2 Patient Position - Orthostatic VS   02/24/25 1224 02/24/25 1224 02/24/25 1224 02/24/25 1224 02/24/25 1224 02/24/25 1224   98.4 °F (36.9 °C) 82 121/68 18 98 % Sitting      Temp Source Heart Rate Source BP Location FiO2 (%) Pain Score    02/24/25 1224 02/24/25 1224 02/24/25 1224 -- 02/24/25 1311    Temporal Monitor Left arm  3      Vitals      Date and Time Temp Pulse SpO2 Resp BP Pain Score FACES Pain Rating User   02/24/25 1311 -- -- -- -- -- 3 -- JV   02/24/25 1224 98.4 °F (36.9 °C) 82 98 % 18 121/68 -- -- [facility]            Physical Exam  Vitals and nursing note reviewed.    Constitutional:       General: She is not in acute distress.     Appearance: Normal appearance. She is not ill-appearing, toxic-appearing or diaphoretic.      Comments: Awake, alert, pleasant, interactive   HENT:      Head: Normocephalic and atraumatic.      Comments: No external signs of trauma.  No scalp hematoma.     Right Ear: External ear normal.      Left Ear: External ear normal.   Eyes:      General:         Right eye: No discharge.         Left eye: No discharge.   Cardiovascular:      Comments: Regular rate and rhythm, no murmurs rubs or gallops.  Extremities warm and well-perfused without mottling  Pulmonary:      Effort: No respiratory distress.      Comments: No increased work of breathing.  Speaking in complete sentences.  Lungs clear to auscultation bilaterally without wheezes, rales, rhonchi.  Satting 98% on room air indicating adequate oxygenation  Abdominal:      General: There is no distension.   Musculoskeletal:         General: No deformity.      Cervical back: Normal range of motion.   Skin:     Findings: No lesion or rash.   Neurological:      Mental Status: She is alert and oriented to person, place, and time. Mental status is at baseline.   Psychiatric:         Mood and Affect: Mood and affect normal.         Results Reviewed       None            CT head without contrast   Final Interpretation by Desmond Kong MD (02/24 0138)      No acute intracranial abnormality.                  Workstation performed: [id number]         XR hip/pelv 2-3 vws right if performed   Final Interpretation by Desmond Kong MD (02/24 1591)      No acute osseous abnormality.         Computerized Assisted Algorithm (CAA) may have been used to analyze all applicable images.            Workstation performed: [id number]         XR tibia fibula 2 views RIGHT   Final Interpretation by Desmond Kong MD (02/24 1039)      No acute osseous abnormality.            Computerized Assisted Algorithm (CAA) may  have been used to analyze all applicable images.               Workstation performed: [id number]         XR knee 4+ vw right injury   Final Interpretation by Desmond Kong MD (1426)      No acute osseous abnormality.         Computerized Assisted Algorithm (CAA) may have been used to analyze all applicable images.         Workstation performed: [id number]         XR femur 2 views RIGHT   Final Interpretation by Desmond Kong MD (1427)      No acute osseous abnormality.         Computerized Assisted Algorithm (CAA) may have been used to analyze all applicable images.         Workstation performed: [id number]             Procedures    ED Medication and Procedure Management   Prior to Admission Medications   Prescriptions Last Dose Informant Patient Reported? Taking?   Breztri Aerosphere 160-9-4.8 MCG/ACT AERO   Yes No   Sig: Inhale 2 puffs 2 (two) times a day   Eliquis 5 MG 2025  Yes Yes   Sig: Take 5 mg by mouth 2 (two) times a day   atorvastatin (LIPITOR) 80 mg tablet   Yes No   Sig: Take 80 mg by mouth daily at bedtime   benzonatate (TESSALON) 200 MG capsule   No No   Sig: Take 1 capsule (200 mg total) by mouth 3 (three) times a day as needed for cough   bumetanide (BUMEX) 0.5 MG tablet   Yes No   Sig: Take 0.5 mg by mouth daily   fluticasone (FLONASE) 50 mcg/act nasal spray   No No   Si spray into each nostril 2 (two) times a day for 3 days   furosemide (LASIX) 20 mg tablet   Yes No   Sig: Take 20 mg by mouth daily   Patient not taking: Reported on 2024   levothyroxine 137 mcg tablet   Yes No   Sig: Take 137 mcg by mouth daily   metoprolol succinate (TOPROL-XL) 100 mg 24 hr tablet 2025  Yes Yes   Sig: Take 100 mg by mouth daily at bedtime   triamcinolone (KENALOG) 0.5 % ointment   Yes No   Sig: Apply 1 application topically 2 (two) times a day as needed      Facility-Administered Medications: None     Discharge Medication List as of 2025  2:34 PM        CONTINUE these  medications which have NOT CHANGED    Details   Eliquis 5 MG Take 5 mg by mouth 2 (two) times a day, Starting Mon 3/28/2022, Historical Med      metoprolol succinate (TOPROL-XL) 100 mg 24 hr tablet Take 100 mg by mouth daily at bedtime, Starting Tue 5/10/2022, Historical Med      atorvastatin (LIPITOR) 80 mg tablet Take 80 mg by mouth daily at bedtime, Starting Tue 5/10/2022, Historical Med      benzonatate (TESSALON) 200 MG capsule Take 1 capsule (200 mg total) by mouth 3 (three) times a day as needed for cough, Starting Thu 5/2/2024, Normal      Breztri Aerosphere 160-9-4.8 MCG/ACT AERO Inhale 2 puffs 2 (two) times a day, Starting Tue 5/10/2022, Historical Med      bumetanide (BUMEX) 0.5 MG tablet Take 0.5 mg by mouth daily, Starting Mon 2/12/2024, Historical Med      fluticasone (FLONASE) 50 mcg/act nasal spray 1 spray into each nostril 2 (two) times a day for 3 days, Starting Thu 5/2/2024, Until Sun 5/5/2024, Normal      furosemide (LASIX) 20 mg tablet Take 20 mg by mouth daily, Starting Tue 5/10/2022, Historical Med      levothyroxine 137 mcg tablet Take 137 mcg by mouth daily, Starting Sun 2/27/2022, Historical Med      triamcinolone (KENALOG) 0.5 % ointment Apply 1 application topically 2 (two) times a day as needed, Starting Tue 5/10/2022, Historical Med           No discharge procedures on file.  ED SEPSIS DOCUMENTATION   Time reflects when diagnosis was documented in both MDM as applicable and the Disposition within this note       Time User Action Codes Description Comment    2/24/2025  2:33 PM Aquilino Lopez Add [S80.10XA] Multiple leg contusions                  Aquilino Lopez MD  02/24/25 145